# Patient Record
Sex: FEMALE | Race: WHITE | NOT HISPANIC OR LATINO | Employment: OTHER | URBAN - METROPOLITAN AREA
[De-identification: names, ages, dates, MRNs, and addresses within clinical notes are randomized per-mention and may not be internally consistent; named-entity substitution may affect disease eponyms.]

---

## 2023-10-02 ENCOUNTER — APPOINTMENT (OUTPATIENT)
Dept: ADMISSIONS | Facility: MEDICAL CENTER | Age: 73
End: 2023-10-02
Attending: ORTHOPAEDIC SURGERY
Payer: MEDICARE

## 2023-10-06 ENCOUNTER — PRE-ADMISSION TESTING (OUTPATIENT)
Dept: ADMISSIONS | Facility: MEDICAL CENTER | Age: 73
End: 2023-10-06
Attending: ORTHOPAEDIC SURGERY
Payer: MEDICARE

## 2023-10-06 DIAGNOSIS — Z01.810 PRE-OPERATIVE CARDIOVASCULAR EXAMINATION: ICD-10-CM

## 2023-10-06 DIAGNOSIS — Z01.812 PRE-OPERATIVE LABORATORY EXAMINATION: ICD-10-CM

## 2023-10-06 RX ORDER — MULTIVIT-MIN/IRON/FOLIC ACID/K 18-600-40
CAPSULE ORAL DAILY
COMMUNITY

## 2023-10-06 RX ORDER — COVID-19 ANTIGEN TEST
KIT MISCELLANEOUS PRN
COMMUNITY

## 2023-10-06 RX ORDER — SODIUM PHOSPHATE,MONO-DIBASIC 19G-7G/118
500 ENEMA (ML) RECTAL DAILY
COMMUNITY

## 2023-10-06 RX ORDER — CALCIUM CARBONATE 500 MG/1
500 TABLET, CHEWABLE ORAL PRN
COMMUNITY

## 2023-10-06 RX ORDER — OYSTER SHELL CALCIUM WITH VITAMIN D 500; 200 MG/1; [IU]/1
TABLET, FILM COATED ORAL 2 TIMES DAILY
COMMUNITY

## 2023-10-06 NOTE — PREPROCEDURE INSTRUCTIONS
PreAdmit Telephone Appointment: Reviewed the Preparing for your procedure handout with patient over the phone. Patient instructed per pharmacy guidelines regarding taking or holding regularly prescribed medications before surgery. Instructed to take the following medications the day of surgery with a sip of water per pharmacy medication guidelines: None  Patient instructed to notify physician of any illness prior to surgery. Pt denies issue with anesthesia.

## 2023-10-10 ENCOUNTER — PRE-ADMISSION TESTING (OUTPATIENT)
Dept: ADMISSIONS | Facility: MEDICAL CENTER | Age: 73
End: 2023-10-10
Attending: ORTHOPAEDIC SURGERY
Payer: MEDICARE

## 2023-10-10 DIAGNOSIS — Z01.812 PRE-OPERATIVE LABORATORY EXAMINATION: ICD-10-CM

## 2023-10-10 DIAGNOSIS — Z01.810 PRE-OPERATIVE CARDIOVASCULAR EXAMINATION: ICD-10-CM

## 2023-10-10 LAB
ABO GROUP BLD: NORMAL
ANION GAP SERPL CALC-SCNC: 12 MMOL/L (ref 7–16)
BUN SERPL-MCNC: 14 MG/DL (ref 8–22)
CALCIUM SERPL-MCNC: 8.8 MG/DL (ref 8.4–10.2)
CHLORIDE SERPL-SCNC: 102 MMOL/L (ref 96–112)
CO2 SERPL-SCNC: 26 MMOL/L (ref 20–33)
CREAT SERPL-MCNC: 0.71 MG/DL (ref 0.5–1.4)
EKG IMPRESSION: NORMAL
ERYTHROCYTE [DISTWIDTH] IN BLOOD BY AUTOMATED COUNT: 39.9 FL (ref 35.9–50)
GFR SERPLBLD CREATININE-BSD FMLA CKD-EPI: 90 ML/MIN/1.73 M 2
GLUCOSE SERPL-MCNC: 94 MG/DL (ref 65–99)
HCT VFR BLD AUTO: 43.1 % (ref 37–47)
HGB BLD-MCNC: 14.6 G/DL (ref 12–16)
MCH RBC QN AUTO: 31.5 PG (ref 27–33)
MCHC RBC AUTO-ENTMCNC: 33.9 G/DL (ref 32.2–35.5)
MCV RBC AUTO: 93.1 FL (ref 81.4–97.8)
PLATELET # BLD AUTO: 225 K/UL (ref 164–446)
PMV BLD AUTO: 9.6 FL (ref 9–12.9)
POTASSIUM SERPL-SCNC: 4.1 MMOL/L (ref 3.6–5.5)
RBC # BLD AUTO: 4.63 M/UL (ref 4.2–5.4)
RH BLD: NORMAL
SCCMEC + MECA PNL NOSE NAA+PROBE: NEGATIVE
SCCMEC + MECA PNL NOSE NAA+PROBE: NEGATIVE
SODIUM SERPL-SCNC: 140 MMOL/L (ref 135–145)
WBC # BLD AUTO: 6.1 K/UL (ref 4.8–10.8)

## 2023-10-10 PROCEDURE — 86901 BLOOD TYPING SEROLOGIC RH(D): CPT

## 2023-10-10 PROCEDURE — 80048 BASIC METABOLIC PNL TOTAL CA: CPT

## 2023-10-10 PROCEDURE — 36415 COLL VENOUS BLD VENIPUNCTURE: CPT

## 2023-10-10 PROCEDURE — 93005 ELECTROCARDIOGRAM TRACING: CPT

## 2023-10-10 PROCEDURE — 93010 ELECTROCARDIOGRAM REPORT: CPT | Performed by: INTERNAL MEDICINE

## 2023-10-10 PROCEDURE — 85027 COMPLETE CBC AUTOMATED: CPT

## 2023-10-10 PROCEDURE — 87640 STAPH A DNA AMP PROBE: CPT | Mod: XU

## 2023-10-10 PROCEDURE — 86900 BLOOD TYPING SEROLOGIC ABO: CPT

## 2023-10-10 PROCEDURE — 87641 MR-STAPH DNA AMP PROBE: CPT

## 2023-10-10 NOTE — DISCHARGE PLANNING
DISCHARGE PLANNING NOTE - TOTAL JOINT    Procedure: Procedure(s):  RIGHT TOTAL HIP ARTHROPLASTY  Procedure Date: 10/20/2023  Insurance: Payor: MEDICARE / Plan: MEDICARE PART A & B / Product Type: *No Product type* /    Equipment currently available at home?  crutches and will get something to sit on in the shower.  Steps into the home? 1  Steps within the home? 1 flight  Toilet height? Standard  Type of shower? tub-shower  Home Oxygen? No.  Portable tank?    Oxygen Provider:  Who will be with you during your recovery? spouse  Is Outpatient Physical Therapy set up after surgery? Yes  Did you take the Total Joint Class and where? Yes, received [facility] book.  Planning same day discharge?Yes     This writer met with pt and educated to preop showers, nasal mrsa swab and potential for overnight stay. CHG kit given to pt. Home safety checklist and equipment resource guide sent home with pt. Pt educated to dc criteria. All questions answered and verbalizes understanding of all instructions. No dc needs identified at this time. Anticipate dc to home without barriers.

## 2023-10-20 ENCOUNTER — APPOINTMENT (OUTPATIENT)
Dept: RADIOLOGY | Facility: MEDICAL CENTER | Age: 73
End: 2023-10-20
Payer: MEDICARE

## 2023-10-20 ENCOUNTER — ANESTHESIA (OUTPATIENT)
Dept: SURGERY | Facility: MEDICAL CENTER | Age: 73
End: 2023-10-20
Payer: MEDICARE

## 2023-10-20 ENCOUNTER — HOSPITAL ENCOUNTER (OUTPATIENT)
Facility: MEDICAL CENTER | Age: 73
End: 2023-10-20
Attending: ORTHOPAEDIC SURGERY | Admitting: ORTHOPAEDIC SURGERY
Payer: MEDICARE

## 2023-10-20 ENCOUNTER — ANESTHESIA EVENT (OUTPATIENT)
Dept: SURGERY | Facility: MEDICAL CENTER | Age: 73
End: 2023-10-20
Payer: MEDICARE

## 2023-10-20 VITALS
HEART RATE: 75 BPM | HEIGHT: 69 IN | OXYGEN SATURATION: 97 % | BODY MASS INDEX: 17.67 KG/M2 | WEIGHT: 119.27 LBS | RESPIRATION RATE: 16 BRPM | DIASTOLIC BLOOD PRESSURE: 72 MMHG | SYSTOLIC BLOOD PRESSURE: 125 MMHG | TEMPERATURE: 96.8 F

## 2023-10-20 DIAGNOSIS — Z01.812 PRE-OPERATIVE LABORATORY EXAMINATION: ICD-10-CM

## 2023-10-20 LAB
ABO GROUP BLD: NORMAL
BLD GP AB SCN SERPL QL: NORMAL
RH BLD: NORMAL

## 2023-10-20 PROCEDURE — 97165 OT EVAL LOW COMPLEX 30 MIN: CPT

## 2023-10-20 PROCEDURE — C1776 JOINT DEVICE (IMPLANTABLE): HCPCS | Performed by: ORTHOPAEDIC SURGERY

## 2023-10-20 PROCEDURE — 700111 HCHG RX REV CODE 636 W/ 250 OVERRIDE (IP): Mod: JZ | Performed by: ANESTHESIOLOGY

## 2023-10-20 PROCEDURE — A9270 NON-COVERED ITEM OR SERVICE: HCPCS | Performed by: ANESTHESIOLOGY

## 2023-10-20 PROCEDURE — 97162 PT EVAL MOD COMPLEX 30 MIN: CPT

## 2023-10-20 PROCEDURE — 160036 HCHG PACU - EA ADDL 30 MINS PHASE I: Performed by: ORTHOPAEDIC SURGERY

## 2023-10-20 PROCEDURE — 700101 HCHG RX REV CODE 250: Performed by: ANESTHESIOLOGY

## 2023-10-20 PROCEDURE — 700111 HCHG RX REV CODE 636 W/ 250 OVERRIDE (IP): Mod: JZ | Performed by: ORTHOPAEDIC SURGERY

## 2023-10-20 PROCEDURE — 160035 HCHG PACU - 1ST 60 MINS PHASE I: Performed by: ORTHOPAEDIC SURGERY

## 2023-10-20 PROCEDURE — 160047 HCHG PACU  - EA ADDL 30 MINS PHASE II: Performed by: ORTHOPAEDIC SURGERY

## 2023-10-20 PROCEDURE — 86850 RBC ANTIBODY SCREEN: CPT

## 2023-10-20 PROCEDURE — 700101 HCHG RX REV CODE 250: Mod: JZ | Performed by: ORTHOPAEDIC SURGERY

## 2023-10-20 PROCEDURE — 502000 HCHG MISC OR IMPLANTS RC 0278: Performed by: ORTHOPAEDIC SURGERY

## 2023-10-20 PROCEDURE — C1713 ANCHOR/SCREW BN/BN,TIS/BN: HCPCS | Performed by: ORTHOPAEDIC SURGERY

## 2023-10-20 PROCEDURE — 700102 HCHG RX REV CODE 250 W/ 637 OVERRIDE(OP): Performed by: ANESTHESIOLOGY

## 2023-10-20 PROCEDURE — 160042 HCHG SURGERY MINUTES - EA ADDL 1 MIN LEVEL 5: Performed by: ORTHOPAEDIC SURGERY

## 2023-10-20 PROCEDURE — 73502 X-RAY EXAM HIP UNI 2-3 VIEWS: CPT | Mod: RT

## 2023-10-20 PROCEDURE — 86900 BLOOD TYPING SEROLOGIC ABO: CPT

## 2023-10-20 PROCEDURE — 160009 HCHG ANES TIME/MIN: Performed by: ORTHOPAEDIC SURGERY

## 2023-10-20 PROCEDURE — 700105 HCHG RX REV CODE 258: Performed by: ORTHOPAEDIC SURGERY

## 2023-10-20 PROCEDURE — 160025 RECOVERY II MINUTES (STATS): Performed by: ORTHOPAEDIC SURGERY

## 2023-10-20 PROCEDURE — 160048 HCHG OR STATISTICAL LEVEL 1-5: Performed by: ORTHOPAEDIC SURGERY

## 2023-10-20 PROCEDURE — 86901 BLOOD TYPING SEROLOGIC RH(D): CPT

## 2023-10-20 PROCEDURE — 36415 COLL VENOUS BLD VENIPUNCTURE: CPT

## 2023-10-20 PROCEDURE — 160046 HCHG PACU - 1ST 60 MINS PHASE II: Performed by: ORTHOPAEDIC SURGERY

## 2023-10-20 PROCEDURE — 160002 HCHG RECOVERY MINUTES (STAT): Performed by: ORTHOPAEDIC SURGERY

## 2023-10-20 PROCEDURE — C9290 INJ, BUPIVACAINE LIPOSOME: HCPCS | Mod: JZ | Performed by: ORTHOPAEDIC SURGERY

## 2023-10-20 PROCEDURE — 160031 HCHG SURGERY MINUTES - 1ST 30 MINS LEVEL 5: Performed by: ORTHOPAEDIC SURGERY

## 2023-10-20 PROCEDURE — 97535 SELF CARE MNGMENT TRAINING: CPT

## 2023-10-20 DEVICE — IMPLANT REF SPHER HEAD SCREW 40MM (1EA): Type: IMPLANTABLE DEVICE | Site: HIP | Status: FUNCTIONAL

## 2023-10-20 DEVICE — IMPLANT OXINIUM FEM HD 12/14 36 MM -3 (1EA): Type: IMPLANTABLE DEVICE | Site: HIP | Status: FUNCTIONAL

## 2023-10-20 DEVICE — IMPLANT REF THREADED HOLE COVER (1EA): Type: IMPLANTABLE DEVICE | Site: HIP | Status: FUNCTIONAL

## 2023-10-20 DEVICE — IMPLANTABLE DEVICE: Type: IMPLANTABLE DEVICE | Site: HIP | Status: FUNCTIONAL

## 2023-10-20 DEVICE — LINER ACETABULAR R3 ODEG XLPE 36MMX54MM (1EA): Type: IMPLANTABLE DEVICE | Site: HIP | Status: FUNCTIONAL

## 2023-10-20 DEVICE — IMPLANT R3 3 HOLE ACET SHELL 54MM (1EA): Type: IMPLANTABLE DEVICE | Site: HIP | Status: FUNCTIONAL

## 2023-10-20 RX ORDER — ACETAMINOPHEN 500 MG
1000 TABLET ORAL ONCE
Status: COMPLETED | OUTPATIENT
Start: 2023-10-20 | End: 2023-10-20

## 2023-10-20 RX ORDER — DEXAMETHASONE SODIUM PHOSPHATE 4 MG/ML
INJECTION, SOLUTION INTRA-ARTICULAR; INTRALESIONAL; INTRAMUSCULAR; INTRAVENOUS; SOFT TISSUE PRN
Status: DISCONTINUED | OUTPATIENT
Start: 2023-10-20 | End: 2023-10-20 | Stop reason: SURG

## 2023-10-20 RX ORDER — OXYCODONE HYDROCHLORIDE 10 MG/1
10 TABLET ORAL
Status: CANCELLED | OUTPATIENT
Start: 2023-10-20

## 2023-10-20 RX ORDER — SODIUM CHLORIDE 9 MG/ML
INJECTION, SOLUTION INTRAMUSCULAR; INTRAVENOUS; SUBCUTANEOUS
Status: DISCONTINUED | OUTPATIENT
Start: 2023-10-20 | End: 2023-10-20 | Stop reason: HOSPADM

## 2023-10-20 RX ORDER — DOCUSATE SODIUM 100 MG/1
100 CAPSULE, LIQUID FILLED ORAL 2 TIMES DAILY
Status: CANCELLED | OUTPATIENT
Start: 2023-10-20

## 2023-10-20 RX ORDER — LIDOCAINE HYDROCHLORIDE 20 MG/ML
INJECTION, SOLUTION EPIDURAL; INFILTRATION; INTRACAUDAL; PERINEURAL PRN
Status: DISCONTINUED | OUTPATIENT
Start: 2023-10-20 | End: 2023-10-20 | Stop reason: SURG

## 2023-10-20 RX ORDER — HYDROMORPHONE HYDROCHLORIDE 1 MG/ML
0.5 INJECTION, SOLUTION INTRAMUSCULAR; INTRAVENOUS; SUBCUTANEOUS
Status: DISCONTINUED | OUTPATIENT
Start: 2023-10-20 | End: 2023-10-20 | Stop reason: HOSPADM

## 2023-10-20 RX ORDER — OXYCODONE HYDROCHLORIDE 5 MG/1
5 TABLET ORAL
Status: CANCELLED | OUTPATIENT
Start: 2023-10-20

## 2023-10-20 RX ORDER — SODIUM CHLORIDE, SODIUM LACTATE, POTASSIUM CHLORIDE, CALCIUM CHLORIDE 600; 310; 30; 20 MG/100ML; MG/100ML; MG/100ML; MG/100ML
INJECTION, SOLUTION INTRAVENOUS CONTINUOUS
Status: DISCONTINUED | OUTPATIENT
Start: 2023-10-20 | End: 2023-10-20 | Stop reason: HOSPADM

## 2023-10-20 RX ORDER — ENEMA 19; 7 G/133ML; G/133ML
1 ENEMA RECTAL
Status: CANCELLED | OUTPATIENT
Start: 2023-10-20

## 2023-10-20 RX ORDER — OXYCODONE HCL 5 MG/5 ML
5 SOLUTION, ORAL ORAL
Status: DISCONTINUED | OUTPATIENT
Start: 2023-10-20 | End: 2023-10-20 | Stop reason: HOSPADM

## 2023-10-20 RX ORDER — ROCURONIUM BROMIDE 10 MG/ML
INJECTION, SOLUTION INTRAVENOUS PRN
Status: DISCONTINUED | OUTPATIENT
Start: 2023-10-20 | End: 2023-10-20 | Stop reason: SURG

## 2023-10-20 RX ORDER — CELECOXIB 200 MG/1
200 CAPSULE ORAL ONCE
Status: DISCONTINUED | OUTPATIENT
Start: 2023-10-20 | End: 2023-10-20 | Stop reason: HOSPADM

## 2023-10-20 RX ORDER — BISACODYL 10 MG
10 SUPPOSITORY, RECTAL RECTAL
Status: CANCELLED | OUTPATIENT
Start: 2023-10-20

## 2023-10-20 RX ORDER — DEXAMETHASONE SODIUM PHOSPHATE 4 MG/ML
10 INJECTION, SOLUTION INTRA-ARTICULAR; INTRALESIONAL; INTRAMUSCULAR; INTRAVENOUS; SOFT TISSUE ONCE
Status: CANCELLED | OUTPATIENT
Start: 2023-10-21 | End: 2023-10-21

## 2023-10-20 RX ORDER — ROPIVACAINE HYDROCHLORIDE 5 MG/ML
INJECTION, SOLUTION EPIDURAL; INFILTRATION; PERINEURAL
Status: DISCONTINUED | OUTPATIENT
Start: 2023-10-20 | End: 2023-10-20 | Stop reason: HOSPADM

## 2023-10-20 RX ORDER — HYDROMORPHONE HYDROCHLORIDE 1 MG/ML
0.2 INJECTION, SOLUTION INTRAMUSCULAR; INTRAVENOUS; SUBCUTANEOUS
Status: DISCONTINUED | OUTPATIENT
Start: 2023-10-20 | End: 2023-10-20 | Stop reason: HOSPADM

## 2023-10-20 RX ORDER — ASPIRIN 325 MG
325 TABLET ORAL DAILY
Status: CANCELLED | OUTPATIENT
Start: 2023-10-20

## 2023-10-20 RX ORDER — ONDANSETRON 2 MG/ML
4 INJECTION INTRAMUSCULAR; INTRAVENOUS
Status: COMPLETED | OUTPATIENT
Start: 2023-10-20 | End: 2023-10-20

## 2023-10-20 RX ORDER — DEXAMETHASONE SODIUM PHOSPHATE 4 MG/ML
4 INJECTION, SOLUTION INTRA-ARTICULAR; INTRALESIONAL; INTRAMUSCULAR; INTRAVENOUS; SOFT TISSUE
Status: CANCELLED | OUTPATIENT
Start: 2023-10-20

## 2023-10-20 RX ORDER — HYDROMORPHONE HYDROCHLORIDE 1 MG/ML
0.4 INJECTION, SOLUTION INTRAMUSCULAR; INTRAVENOUS; SUBCUTANEOUS
Status: DISCONTINUED | OUTPATIENT
Start: 2023-10-20 | End: 2023-10-20 | Stop reason: HOSPADM

## 2023-10-20 RX ORDER — KETOROLAC TROMETHAMINE 30 MG/ML
INJECTION, SOLUTION INTRAMUSCULAR; INTRAVENOUS
Status: DISCONTINUED | OUTPATIENT
Start: 2023-10-20 | End: 2023-10-20 | Stop reason: HOSPADM

## 2023-10-20 RX ORDER — MAGNESIUM SULFATE HEPTAHYDRATE 40 MG/ML
INJECTION, SOLUTION INTRAVENOUS PRN
Status: DISCONTINUED | OUTPATIENT
Start: 2023-10-20 | End: 2023-10-20 | Stop reason: SURG

## 2023-10-20 RX ORDER — HALOPERIDOL 5 MG/ML
1 INJECTION INTRAMUSCULAR
Status: DISCONTINUED | OUTPATIENT
Start: 2023-10-20 | End: 2023-10-20 | Stop reason: HOSPADM

## 2023-10-20 RX ORDER — MIDAZOLAM HYDROCHLORIDE 1 MG/ML
INJECTION INTRAMUSCULAR; INTRAVENOUS PRN
Status: DISCONTINUED | OUTPATIENT
Start: 2023-10-20 | End: 2023-10-20 | Stop reason: SURG

## 2023-10-20 RX ORDER — HYDROMORPHONE HYDROCHLORIDE 2 MG/ML
INJECTION, SOLUTION INTRAMUSCULAR; INTRAVENOUS; SUBCUTANEOUS PRN
Status: DISCONTINUED | OUTPATIENT
Start: 2023-10-20 | End: 2023-10-20 | Stop reason: SURG

## 2023-10-20 RX ORDER — ACETAMINOPHEN 500 MG
1000 TABLET ORAL EVERY 6 HOURS PRN
Status: CANCELLED | OUTPATIENT
Start: 2023-10-25

## 2023-10-20 RX ORDER — MORPHINE SULFATE 4 MG/ML
INJECTION INTRAVENOUS
Status: DISCONTINUED | OUTPATIENT
Start: 2023-10-20 | End: 2023-10-20 | Stop reason: HOSPADM

## 2023-10-20 RX ORDER — AMOXICILLIN 250 MG
1 CAPSULE ORAL
Status: CANCELLED | OUTPATIENT
Start: 2023-10-20

## 2023-10-20 RX ORDER — AMOXICILLIN 250 MG
1 CAPSULE ORAL NIGHTLY
Status: CANCELLED | OUTPATIENT
Start: 2023-10-20

## 2023-10-20 RX ORDER — MORPHINE SULFATE 4 MG/ML
4 INJECTION INTRAVENOUS
Status: CANCELLED | OUTPATIENT
Start: 2023-10-20

## 2023-10-20 RX ORDER — ONDANSETRON 2 MG/ML
4 INJECTION INTRAMUSCULAR; INTRAVENOUS EVERY 4 HOURS PRN
Status: CANCELLED | OUTPATIENT
Start: 2023-10-20

## 2023-10-20 RX ORDER — MAGNESIUM SULFATE HEPTAHYDRATE 40 MG/ML
INJECTION, SOLUTION INTRAVENOUS PRN
Status: DISCONTINUED | OUTPATIENT
Start: 2023-10-20 | End: 2023-10-20

## 2023-10-20 RX ORDER — HYDRALAZINE HYDROCHLORIDE 20 MG/ML
5 INJECTION INTRAMUSCULAR; INTRAVENOUS
Status: DISCONTINUED | OUTPATIENT
Start: 2023-10-20 | End: 2023-10-20 | Stop reason: HOSPADM

## 2023-10-20 RX ORDER — ONDANSETRON 2 MG/ML
INJECTION INTRAMUSCULAR; INTRAVENOUS PRN
Status: DISCONTINUED | OUTPATIENT
Start: 2023-10-20 | End: 2023-10-20 | Stop reason: SURG

## 2023-10-20 RX ORDER — ACETAMINOPHEN 500 MG
1000 TABLET ORAL EVERY 6 HOURS
Status: CANCELLED | OUTPATIENT
Start: 2023-10-20 | End: 2023-10-25

## 2023-10-20 RX ORDER — SCOLOPAMINE TRANSDERMAL SYSTEM 1 MG/1
1 PATCH, EXTENDED RELEASE TRANSDERMAL
Status: CANCELLED | OUTPATIENT
Start: 2023-10-20

## 2023-10-20 RX ORDER — SODIUM CHLORIDE, SODIUM LACTATE, POTASSIUM CHLORIDE, CALCIUM CHLORIDE 600; 310; 30; 20 MG/100ML; MG/100ML; MG/100ML; MG/100ML
INJECTION, SOLUTION INTRAVENOUS CONTINUOUS
Status: ACTIVE | OUTPATIENT
Start: 2023-10-20 | End: 2023-10-20

## 2023-10-20 RX ORDER — IBUPROFEN 400 MG/1
800 TABLET ORAL 3 TIMES DAILY
Status: CANCELLED | OUTPATIENT
Start: 2023-10-20 | End: 2023-10-25

## 2023-10-20 RX ORDER — OXYCODONE HCL 5 MG/5 ML
10 SOLUTION, ORAL ORAL
Status: DISCONTINUED | OUTPATIENT
Start: 2023-10-20 | End: 2023-10-20 | Stop reason: HOSPADM

## 2023-10-20 RX ORDER — DIPHENHYDRAMINE HCL 25 MG
25 TABLET ORAL NIGHTLY PRN
Status: CANCELLED | OUTPATIENT
Start: 2023-10-21

## 2023-10-20 RX ORDER — TRANEXAMIC ACID 100 MG/ML
INJECTION, SOLUTION INTRAVENOUS PRN
Status: DISCONTINUED | OUTPATIENT
Start: 2023-10-20 | End: 2023-10-20 | Stop reason: SURG

## 2023-10-20 RX ORDER — POLYETHYLENE GLYCOL 3350 17 G/17G
1 POWDER, FOR SOLUTION ORAL 2 TIMES DAILY PRN
Status: CANCELLED | OUTPATIENT
Start: 2023-10-20

## 2023-10-20 RX ORDER — CEFAZOLIN SODIUM 1 G/3ML
INJECTION, POWDER, FOR SOLUTION INTRAMUSCULAR; INTRAVENOUS PRN
Status: DISCONTINUED | OUTPATIENT
Start: 2023-10-20 | End: 2023-10-20 | Stop reason: SURG

## 2023-10-20 RX ORDER — DIPHENHYDRAMINE HYDROCHLORIDE 50 MG/ML
25 INJECTION INTRAMUSCULAR; INTRAVENOUS EVERY 6 HOURS PRN
Status: CANCELLED | OUTPATIENT
Start: 2023-10-20

## 2023-10-20 RX ORDER — IBUPROFEN 400 MG/1
800 TABLET ORAL 3 TIMES DAILY PRN
Status: CANCELLED | OUTPATIENT
Start: 2023-10-25

## 2023-10-20 RX ADMIN — LIDOCAINE HYDROCHLORIDE 0.5 ML: 10 INJECTION, SOLUTION EPIDURAL; INFILTRATION; INTRACAUDAL; PERINEURAL at 06:57

## 2023-10-20 RX ADMIN — MAGNESIUM SULFATE HEPTAHYDRATE 3 G: 2 INJECTION, SOLUTION INTRAVENOUS at 08:21

## 2023-10-20 RX ADMIN — ROCURONIUM BROMIDE 50 MG: 50 INJECTION, SOLUTION INTRAVENOUS at 08:04

## 2023-10-20 RX ADMIN — HYDROMORPHONE HYDROCHLORIDE 0.5 MG: 2 INJECTION INTRAMUSCULAR; INTRAVENOUS; SUBCUTANEOUS at 08:22

## 2023-10-20 RX ADMIN — DEXAMETHASONE SODIUM PHOSPHATE 8 MG: 4 INJECTION INTRA-ARTICULAR; INTRALESIONAL; INTRAMUSCULAR; INTRAVENOUS; SOFT TISSUE at 08:07

## 2023-10-20 RX ADMIN — SUGAMMADEX 150 MG: 100 INJECTION, SOLUTION INTRAVENOUS at 09:16

## 2023-10-20 RX ADMIN — TRANEXAMIC ACID 1000 MG: 100 INJECTION, SOLUTION INTRAVENOUS at 09:13

## 2023-10-20 RX ADMIN — HYDRALAZINE HYDROCHLORIDE 5 MG: 20 INJECTION, SOLUTION INTRAMUSCULAR; INTRAVENOUS at 10:14

## 2023-10-20 RX ADMIN — CEFAZOLIN 2 G: 1 INJECTION, POWDER, FOR SOLUTION INTRAMUSCULAR; INTRAVENOUS at 08:00

## 2023-10-20 RX ADMIN — PROPOFOL 120 MG: 10 INJECTION, EMULSION INTRAVENOUS at 08:04

## 2023-10-20 RX ADMIN — ACETAMINOPHEN 1000 MG: 500 TABLET ORAL at 06:57

## 2023-10-20 RX ADMIN — ONDANSETRON 4 MG: 2 INJECTION INTRAMUSCULAR; INTRAVENOUS at 09:15

## 2023-10-20 RX ADMIN — LIDOCAINE HYDROCHLORIDE 60 MG: 20 INJECTION, SOLUTION EPIDURAL; INFILTRATION; INTRACAUDAL at 08:04

## 2023-10-20 RX ADMIN — TRANEXAMIC ACID 1000 MG: 100 INJECTION, SOLUTION INTRAVENOUS at 08:04

## 2023-10-20 RX ADMIN — HYDROMORPHONE HYDROCHLORIDE 0.5 MG: 2 INJECTION INTRAMUSCULAR; INTRAVENOUS; SUBCUTANEOUS at 08:04

## 2023-10-20 RX ADMIN — SODIUM CHLORIDE, POTASSIUM CHLORIDE, SODIUM LACTATE AND CALCIUM CHLORIDE: 600; 310; 30; 20 INJECTION, SOLUTION INTRAVENOUS at 06:58

## 2023-10-20 RX ADMIN — MIDAZOLAM 2 MG: 1 INJECTION, SOLUTION INTRAMUSCULAR; INTRAVENOUS at 08:00

## 2023-10-20 RX ADMIN — ONDANSETRON 4 MG: 2 INJECTION INTRAMUSCULAR; INTRAVENOUS at 10:41

## 2023-10-20 RX ADMIN — SODIUM CHLORIDE, POTASSIUM CHLORIDE, SODIUM LACTATE AND CALCIUM CHLORIDE: 600; 310; 30; 20 INJECTION, SOLUTION INTRAVENOUS at 09:15

## 2023-10-20 ASSESSMENT — COGNITIVE AND FUNCTIONAL STATUS - GENERAL
SUGGESTED CMS G CODE MODIFIER MOBILITY: CI
DRESSING REGULAR LOWER BODY CLOTHING: A LITTLE
TOILETING: A LITTLE
CLIMB 3 TO 5 STEPS WITH RAILING: A LITTLE
SUGGESTED CMS G CODE MODIFIER DAILY ACTIVITY: CJ
DAILY ACTIVITIY SCORE: 21
MOBILITY SCORE: 23
HELP NEEDED FOR BATHING: A LITTLE

## 2023-10-20 ASSESSMENT — GAIT ASSESSMENTS
DEVIATION: STEP TO
GAIT LEVEL OF ASSIST: STANDBY ASSIST
ASSISTIVE DEVICE: FRONT WHEEL WALKER
DISTANCE (FEET): 100

## 2023-10-20 ASSESSMENT — PAIN DESCRIPTION - PAIN TYPE
TYPE: SURGICAL PAIN

## 2023-10-20 ASSESSMENT — ACTIVITIES OF DAILY LIVING (ADL): TOILETING: INDEPENDENT

## 2023-10-20 ASSESSMENT — PAIN SCALES - GENERAL: PAIN_LEVEL: 3

## 2023-10-20 NOTE — OP REPORT
Date of Surgery:   10/20/2023     Preoperative Diagnosis:  Right hip osteoarthritis      Postoperative Diagnosis:  Right hip osteoarthritis Iliac cyst formation      Operation: 1. Right Hip Total Replacement,                      2. Right Iliac cyst excision and bone graft           3. Intraoperative block for relief of extensive postoperative pain.      Surgeon:   Josh Avina MD  Assistant: BRANDI Hunter     Anesthesia and Anesthesiologist:  , Dr. Bal Fung  Findings:  Stable GHASSAN with Iliac cysts     Complications:  None      IMPLANTS : [employer] size 54 R3 cup with a 36 flat cross-linked polyethylene on a size 10 standard offset anthology stem with a -3 neck length 36 mm Oxinium femoral head     WEIGHTBEARING : As tolerated.      PROCEDURE IN DETAIL : The procedure is coded with secondary difficulty in the setting of increased length of incision, supplemental retractor placement and double the normal duration of the surgery due to a combination of the severity of the deformity and requirement for supplemental visualization and incision length increase. The patient was taken to the operating room and underwent general anesthetic in the supine position. All bony prominences were padded. Lateral decubitus undertaken with an axillary roll, Stulberg, and all bony prominences were padded. The right lower extremity was isolated and prepped using Hibiclens, alcohol, ChloraPrep, and draped using sterile technique. Surgery initiated with a minimally invasive posterior approach.      Skin and subcutaneous tissue were incised. Hemostasis obtained and the fascia mandi incised after an appropriate timeout. The East-West retractor was utilized. The short external rotator was taken down as a single layer. A T-capsular incision made. Dislocation of the hip followed.    Intraoperative periarticular and extensive andrea-incisional block was undertaken. This was undertaken with a combination of Toradol  ropivacaine and morphine to treat postoperative pain. This was done extensively throughout the pericapsular tissues, followed by an extensive block in the peritrochanteric tissues, followed by an extensive tissue infusion into the subcutaneous tissues. This obtained a significant intraoperative improvement in anesthesia requirements as well as pain management and vitals stabilization.      A dislocation of the hip followed by a femoral neck osteotomy and then an acetabular reaming and labral excision.  Iliac cysts were encountered after centralization of the cup. This was treated with decompression, curettage and bone grafting.  The cyst size was 1.5 x 1.2 x 1.0 cm.  Structural graft was harvested from the femoral head and reamings.   This was reamed, reverse reamed, and placement of cup was then undertaken. The cup was inserted and seated well. The peripheral rim osteophytic changes removed and the polyethylene inserted and the locking mechanism checked. Pulse lavage repeated.      The femur was addressed with an anterior trochanteric decompression and intramedullary reamer, cookie cutter, and broaching. Trial reductions were repeated and insertion of final stem was performed.  Excellent stability was obtained.  Placement of the head was then performed.  The trial was repeated. The final head inserted after pulse lavage and cleaning and drying the Quinones taper and then the hip relocated with full extension and external rotation, stability, sleeping and flex 90 stability to 80 and 60 degrees. Pulse lavage was repeated. T-capsular closure was done with #2 FiberWire.  Then short external rotators were repaired over bone bridges and the fascia mandi repaired with #1 Quill. Subcuticular Monocryl was utilized and staples used on the skin. The patient placed into a sterile bandage, awoken from anesthetic, and taken to the recovery room, tolerated the procedure very well with no signs of complications.

## 2023-10-20 NOTE — OR NURSING
1101- Pt ambulated to stage II with PT and CNA stand by assist and use of walker. Settled in recliner. Pt working with PT at this time. Family at the bedside.      1135-PT finished working with the patient.     1153-OT at the bedside working with the patient.     1227- D/Jalil to care of family post uneventful stay in PACU. Right hip surgical dressing C/D/I. PIV removed. Discharge instructions read to the patient and .  Questions answered at this time.  Pt discharged home via wheelchair by CNA.

## 2023-10-20 NOTE — ANESTHESIA POSTPROCEDURE EVALUATION
Patient: Jessica Yoder Kennett    Procedure Summary     Date: 10/20/23 Room / Location: [place] [address] / SURGERY [facility]    Anesthesia Start: 0800 Anesthesia Stop: 0932    Procedure: RIGHT TOTAL HIP ARTHROPLASTY, CYST EXCISION, AND BONE GRAFT OF THE HILIUM (Right: Hip) Diagnosis: (OA OF RIGHT HIP JOINT)    Surgeons: Josh Avina M.D. Responsible Provider: Lan Esqueda M.D.    Anesthesia Type: general ASA Status: 2          Final Anesthesia Type: general  Last vitals  BP   Blood Pressure : (!) 154/90    Temp   36.3 °C (97.3 °F)    Pulse   (!) 53   Resp   16    SpO2   100 %      Anesthesia Post Evaluation    Patient location during evaluation: PACU  Patient participation: complete - patient participated  Level of consciousness: awake and alert  Pain score: 3    Airway patency: patent  Anesthetic complications: no  Cardiovascular status: hemodynamically stable  Respiratory status: acceptable  Hydration status: euvolemic    PONV: none          There were no known notable events for this encounter.     Nurse Pain Score: 3 (NPRS)

## 2023-10-20 NOTE — THERAPY
Occupational Therapy   Initial Evaluation     Patient Name: Jessica Darden  Age:  73 y.o., Sex:  female  Medical Record #: [id number]  Today's Date: 10/20/2023     Precautions  Precautions: (P) Posterior Hip Precautions, Weight Bearing As Tolerated Right Lower Extremity    Assessment  Patient is 73 y.o. female with a diagnosis of Right GHASSAN (posterior).  Additional factors influencing patient status / progress: Impaired balance, limited activity tolerance, posterior hip precautions, R hip discomfort.   Pt and  reside in a 2 story home in [place], [place].  Primary bedroom is on the 2nd level.  Accommodations have been made to sleep on the ground level.   is available to assist as needed.  PLOF Indep for ADL's, transfers and ambulation w/out a device.  Therapist reviewed/educated pt and  on environmental/home safety, fall precautions, Posterior hip precautions, WBAT RLE, AE/DME, ADL's and transfers.    Plan    Discharge Recommendations: (P) Anticipate that the patient will have no further occupational therapy needs after discharge from the hospital     Subjective    Pt was alert and cooperative w/ tx.     Objective       10/20/23 1231    Services   Is patient using  services for this encounter? No   Prior Living Situation   Prior Services None   Housing / Facility 2 Story House   Steps Into Home 1   Steps In Home 15   Rail Left Rail (Steps in Home)   Elevator No   Bathroom Set up Bathtub / Shower Combination;Shower Curtain   Equipment Owned Front-Wheel Walker   Lives with - Patient's Self Care Capacity Spouse   Comments Pt and  reside in a 2 story home in [place], [place].  Primary bedroom is on the 2nd level.  Accommodations have been made to sleep on the ground level.   is available to assist as needed.  PLOF Indep for ADL's, transfers and ambulation w/out a device.   Prior Level of ADL Function   Self Feeding Independent   Grooming / Hygiene Independent   Bathing  Independent   Dressing Independent   Toileting Independent   Prior Level of IADL Function   Medication Management Independent   Laundry Independent   Kitchen Mobility Independent   Finances Independent   Home Management Independent   Shopping Independent   Prior Level Of Mobility Independent Without Device in Community;Independent With Steps in Community;Independent Without Device in Home;Independent With Steps in Home   Driving / Transportation Driving Independent   Occupation (Pre-Hospital Vocational) Retired Due To Age   History of Falls   History of Falls No   Precautions   Precautions Posterior Hip Precautions;Weight Bearing As Tolerated Right Lower Extremity   Vitals   Pulse Oximetry 97 %   O2 (LPM) 0   O2 Delivery Device Room air w/o2 available   Pain   Intervention Cold Pack;Rest;Repositioned   Pain 0 - 10 Group   Location Hip;Leg   Location Orientation Right   Description Aching   Comfort Goal Comfort with Movement;Perform Activity   Therapist Pain Assessment Prior to Activity;During Activity;Post Activity;Nurse Notified;2   Non Verbal Descriptors   Non Verbal Scale  Calm;Unlabored Breathing   Cognition    Cognition / Consciousness WDL   Passive ROM Upper Body   Passive ROM Upper Body WDL   Active ROM Upper Body   Active ROM Upper Body  WDL   Dominant Hand Right   Strength Upper Body   Upper Body Strength  WDL   Sensation Upper Body   Upper Extremity Sensation  WDL   Upper Body Muscle Tone   Upper Body Muscle Tone  WDL   Coordination Upper Body   Coordination WDL   Balance Assessment   Sitting Balance (Static) Good   Sitting Balance (Dynamic) Good   Standing Balance (Static) Fair +   Standing Balance (Dynamic) Fair +   Weight Shift Sitting Good   Weight Shift Standing Good   Comments OOB FWW   ADL Assessment   Upper Body Dressing Independent   Lower Body Dressing Minimal Assist   Toileting Minimal Assist   How much help from another person does the patient currently need...   Putting on and taking off  regular lower body clothing? 3   Bathing (including washing, rinsing, and drying)? 3   Toileting, which includes using a toilet, bedpan, or urinal? 3   Putting on and taking off regular upper body clothing? 4   Taking care of personal grooming such as brushing teeth? 4   Eating meals? 4   6 Clicks Daily Activity Score 21   Functional Mobility   Sit to Stand Standby Assist   Bed, Chair, Wheelchair Transfer Standby Assist   Transfer Method Stand Step   Edema / Skin Assessment   Edema / Skin  WDL   Comments Surgical site clean, dry and intact.   Activity Tolerance   Sitting in Chair 20   Standing 5x2   Education Group   Education Provided Hip Precautions;Joint Protection;Home Safety;Transfers;Role of Occupational Therapist;Activities of Daily Living;Adaptive Equipment   Role of Occupational Therapist Patient Response Patient;Significant Other;Acceptance;Explanation;Verbal Demonstration   Hip Precautions Patient Response Patient;Significant Other;Acceptance;Explanation;Demonstration;Handout;Teach Back;Verbal Demonstration;Action Demonstration   Joint Protection Patient Response Patient;Significant Other;Acceptance;Explanation;Demonstration;Handout;Teach Back;Verbal Demonstration;Action Demonstration   Home Safety Patient Response Patient;Significant Other;Acceptance;Explanation;Demonstration;Verbal Demonstration;Action Demonstration;Teach Back   Transfers Patient Response Patient;Significant Other;Acceptance;Explanation;Demonstration;Teach Back;Verbal Demonstration;Action Demonstration   ADL Patient Response Patient;Significant Other;Acceptance;Explanation;Demonstration;Teach Back;Verbal Demonstration;Action Demonstration   Adaptive Equipment Patient Response Patient;Significant Other;Acceptance;Explanation;Demonstration;Handout;Teach Back;Verbal Demonstration;Action Demonstration   Anticipated Discharge Equipment and Recommendations   Discharge Recommendations Anticipate that the patient will have no further  occupational therapy needs after discharge from the hospital

## 2023-10-20 NOTE — DISCHARGE INSTRUCTIONS
Surgeon specific instructions  1. Discharge home with crutches or a walker   2. Discharge home on home meds per home doses   3. Discharge home on Percocet, ASA, and outpatient physical therapy.   4. Follow up [facility] 10-14 days   5. Call for Fevers, drainage, redness, shortness of breath, or increasing lower extremity swelling particularly in the calf.   6. Start Aspirin 325mg. Use for 45 days.   7. Leave Bandage in place, call if drainage or wetness.   8. Refer to packet from  for specific instructions about showering and dressing care.    ACTIVITY: Rest and take it easy for the first 24 hours.  A responsible adult is recommended to remain with you during that time.  It is normal to feel sleepy.  We encourage you to not do anything that requires balance, judgment or coordination.    MILD FLU-LIKE SYMPTOMS ARE NORMAL. YOU MAY EXPERIENCE GENERALIZED MUSCLE ACHES, THROAT IRRITATION, HEADACHE AND/OR SOME NAUSEA.    FOR 24 HOURS DO NOT:  Drive, operate machinery or run household appliances.  Drink beer or alcoholic beverages.   Make important decisions or sign legal documents.    DIET: To avoid nausea, slowly advance diet as tolerated, avoiding spicy or greasy foods for the first day.  Add more substantial food to your diet according to your physician's instructions. INCREASE FLUIDS AND FIBER TO AVOID CONSTIPATION.    FOLLOW-UP APPOINTMENT:  A follow-up appointment should be arranged with your doctor; call to schedule.    You should CALL YOUR PHYSICIAN if you develop:  Fever greater than 101 degrees F.  Pain not relieved by medication, or persistent nausea or vomiting.  Excessive bleeding (blood soaking through dressing) or unexpected drainage from the wound.  Extreme redness or swelling around the incision site, drainage of pus or foul smelling drainage.  Inability to urinate or empty your bladder within 8 hours.  Problems with breathing or chest pain.    You should call 911 if you develop problems with  breathing or chest pain.  If you are unable to contact your doctor or surgical center, you should go to the nearest emergency room or urgent care center.  Physician's telephone #: [phone]    If any questions arise, call your doctor.  If your doctor is not available, please feel free to call the Surgical Center at [phone].     A registered nurse may call you a few days after your surgery to see how you are doing after your procedure.    MEDICATIONS: Resume taking daily medication.  Take prescribed pain medication with food.  If no medication is prescribed, you may take non-aspirin pain medication if needed.  PAIN MEDICATION CAN BE VERY CONSTIPATING.  Take a stool softener or laxative such as senokot, pericolace, or milk of magnesia if needed.    Last pain medication given at ______________.    If your physician has prescribed pain medication that includes Acetaminophen (Tylenol), do not take additional Acetaminophen (Tylenol) while taking the prescribed medication.    IMPORTANT NOTE IF YOU RECEIVED EXPAREL:    The liposomal bupivacaine (Exparel) teal arm band is used as a visual queue to alert healthcare professionals that you received a long-acting form of bupivacaine during your recent surgery. You should not receive additional local anesthetics (i.e. bupivacaine, lidocaine) for 96 hours after surgery. There may be situations where you are unable to communicate this information, so it is important for your safety to keep the teal arm band on for 96 hours (4 days) after surgery.       Total Hip Replacement Discharge Instructions    ACTIVITY  The first week after your hip replacement is a time to recover from the surgery. We expect light exercise to keep you active and mobile.  Posterior Hip Precautions    To care for your new hip and keep it from sliding out of position, you'll need to follow a few general rules at first.     Don't bend your hip past a 90 degree angle.  Don't cross your legs.  Don't  twist your hip inwards-keep knees and toes pointed upwards.     ASSISTED DEVICES: You are being discharged with the following special equipment:  Not Applicable; Has a walker and crutches     DRESSING AND WOUND CARE   Keep dressing clean and dry. Leave dressing in place until follow up.     SHOWER / BATHING   OK to shower, keep dressing in place. Pat dressing dry, do not rub incision.  Swimming pools, hot tubs, or baths are to be avoided until after your follow up and then only if cleared by your surgeon.      APPLY ICE PACK TO HIP   Apply ice packs to your hip (15 minutes on the hip, 15 minutes off the hip) for the first week, as you feel necessary to help with the pain and swelling.    SWELLING/BRUISING  Swelling is an expected response to surgery. To reduce swelling, elevate your surgical limb above heart level multiple times per day and apply ice (see Ice instructions). If your swelling becomes excessive, is limiting your ability to move, or becomes worrisome please contact your doctor's office.    Bruising often does not appear until after you arrive home and can be quite dramatic-appearing purple, black, or green. Bruising is typically not concerning and will subside without any treatment.     FOLLOW-UP  Make sure that you have an appointment 7-14 days following surgery.Your procedure/rehabilitation will be discussed and physical therapy may be prescribed at that time.

## 2023-10-20 NOTE — ANESTHESIA PROCEDURE NOTES
Airway    Date/Time: 10/20/2023 8:05 AM    Performed by: Lan Esqueda M.D.  Authorized by: Lan Esqueda M.D.    Location:  OR  Urgency:  Elective  Difficult Airway: No    Indications for Airway Management:  Anesthesia      Spontaneous Ventilation: absent    Sedation Level:  Deep  Preoxygenated: Yes    Patient Position:  Sniffing  Mask Difficulty Assessment:  1 - vent by mask  Final Airway Type:  Endotracheal airway  Final Endotracheal Airway:  ETT  Cuffed: Yes    Technique Used for Successful ETT Placement:  Direct laryngoscopy    Insertion Site:  Oral  Blade Type:  Paul  Laryngoscope Blade/Videolaryngoscope Blade Size:  2  ETT Size (mm):  6.5  Measured from:  Lips  ETT to Lips (cm):  20  Placement Verified by: auscultation and capnometry    Cormack-Lehane Classification:  Grade I - full view of glottis  Number of Attempts at Approach:  1

## 2023-10-20 NOTE — OR NURSING
Pt allergies and NPO status verified, home meds reconcilled. Belongings secured. Pt verbalizes understanding of the pain scale, expected course of stay, and plan of care.  Surgical site verified with pt.  IV access established.  Sequential/ teds placed on L leg.

## 2023-10-20 NOTE — THERAPY
Physical Therapy   Initial Evaluation     Patient Name: Jessica Darden  Age:  73 y.o., Sex:  female  Medical Record #: [id number]  Today's Date: 10/20/2023     Precautions  Precautions: (P) Posterior Hip Precautions;Weight Bearing As Tolerated Right Lower Extremity    Assessment  Patient is 73 y.o. female with a diagnosis of R THR post.Pt lives at home with  and is active.Pt is safe with bed mob,transfers,ambulation and stairs.She understands HEP and precautions and has no equipment needs    Plan    DC Equipment Recommendations: (P) None  Discharge Recommendations: (P) Recommend home health for continued physical therapy services       Objective       10/20/23 1110   Charge Group   PT Evaluation PT Evaluation Mod   Total Time Spent   PT Evaluation Time Spent (Mins) 30   Initial Contact Note    Initial Contact Note Order Received and Verified, Physical Therapy Evaluation in Progress with Full Report to Follow.   Precautions   Precautions Posterior Hip Precautions;Weight Bearing As Tolerated Right Lower Extremity   Vitals   Pulse 76   Blood Pressure  135/78   Respiration 16   Pulse Oximetry 94 %   O2 (LPM) 0   O2 Delivery Device None - Room Air   Pain   Intervention Cold Pack;Declines   Pain 0 - 10 Group   Pain Rating Scale (NPRS) 0   Comfort Goal Comfort with Movement;Sleep Comfortably   Prior Living Situation   Prior Services None   Housing / Facility 1 Story House   Steps Into Home 1   Steps In Home 0   Equipment Owned Front-Wheel Walker   Lives with - Patient's Self Care Capacity Spouse   Prior Level of Functional Mobility   Bed Mobility Independent   Transfer Status Independent   Ambulation Independent   Ambulation Distance community   Assistive Devices Used None   Stairs Independent   History of Falls   History of Falls No   Cognition    Cognition / Consciousness WDL   Passive ROM Lower Body   Passive ROM Lower Body X   Active ROM Lower Body    Active ROM Lower Body  X   Strength Lower Body   Lower  Body Strength  X   Coordination Lower Body    Coordination Lower Body  WDL   Balance Assessment   Sitting Balance (Static) Good   Sitting Balance (Dynamic) Good   Standing Balance (Static) Fair +   Standing Balance (Dynamic) Fair +   Weight Shift Sitting Good   Weight Shift Standing Good   Gait Analysis   Gait Level Of Assist Standby Assist   Assistive Device Front Wheel Walker   Distance (Feet) 100   # of Times Distance was Traveled 1   Deviation Step To   # of Stairs Climbed 1   Level of Assist with Stairs Minimal Assist   Weight Bearing Status wbat R   Functional Mobility   Sit to Stand Standby Assist   Bed, Chair, Wheelchair Transfer Standby Assist   Transfer Method Stand Step   How much difficulty does the patient currently have...   Turning over in bed (including adjusting bedclothes, sheets and blankets)? 4   Sitting down on and standing up from a chair with arms (e.g., wheelchair, bedside commode, etc.) 4   Moving from lying on back to sitting on the side of the bed? 4   How much help from another person does the patient currently need...   Moving to and from a bed to a chair (including a wheelchair)? 4   Need to walk in a hospital room? 4   Climbing 3-5 steps with a railing? 3   6 clicks Mobility Score 23   Activity Tolerance   Sitting in Chair > 1hr   Standing 8   Patient / Family Goals    Patient / Family Goal #1 Home   Anticipated Discharge Equipment and Recommendations   DC Equipment Recommendations None   Discharge Recommendations Recommend home health for continued physical therapy services   Interdisciplinary Plan of Care Collaboration   IDT Collaboration with  Nursing   Session Information   Date / Session Number  10/20   Priority 0

## 2023-10-20 NOTE — ANESTHESIA PREPROCEDURE EVALUATION
Case: [id number] Date/Time: 10/20/23 0745    Procedure: RIGHT TOTAL HIP ARTHROPLASTY    Pre-op diagnosis: OA OF RIGHT HIP JOINT    Location:  OR  / SURGERY [facility]    Surgeons: Josh Avina M.D.        Denies: MI/CHF/smoking/CVA/DM/CKD      Relevant Problems   No relevant active problems       Physical Exam    Airway   Mallampati: II  TM distance: >3 FB  Neck ROM: full       Cardiovascular - normal exam  Rhythm: regular  Rate: normal  (-) murmur     Dental - normal exam           Pulmonary - normal exam  Breath sounds clear to auscultation     Abdominal    Neurological - normal exam                 Anesthesia Plan    ASA 2       Plan - general       Airway plan will be ETT          Induction: intravenous    Postoperative Plan: Postoperative administration of opioids is intended.    Pertinent diagnostic labs and testing reviewed    Informed Consent:    Anesthetic plan and risks discussed with patient.    Use of blood products discussed with: patient whom consented to blood products.

## 2023-10-20 NOTE — ANESTHESIA TIME REPORT
Anesthesia Start and Stop Event Times     Date Time Event    10/20/2023 0729 Ready for Procedure     0800 Anesthesia Start     0932 Anesthesia Stop        Responsible Staff  10/20/23    Name Role Begin End    Lan Esqueda M.D. Anesth 0800 0932        Overtime Reason:  no overtime (within assigned shift)    Comments:

## 2023-10-20 NOTE — OR NURSING
0930- Patient arrived from OR via gurney.  R hip dressing CDI; pedal pulse 2+. Cold pack applied to the R hip. Exparel band on L wrist.    Sedation/Resp Status: Non responsive to verbal with OPA in place. Respirations spontaneous and non-labored.    HR 53SB; VSS on 10L 02 via simple mask.  The patient does not appear to be in pain or nauseous as evidence by sleeping.     0945- OPA out for return of spontaneous eye opening/gag reflex - respirations continue spontaneous and non-labored. No pain or nausea reported. The dressing remains free of drainage.    0950- BP elevated. Stopped fluids. Trending BP Q5    0949- Called the family and gave updates    1000- No reports of pain or nausea. The dressing is still CDI    1012- Notified  about the patients elevated BP    1014- Medicated per MAR for HTN    1027- Xray tech at bedside getting imaging of the R hip    1030- No change in the patient condition. The dressing is free of drainage.     1038- CNA at bedside performing a march test. Called the family back and gave another update    1041- Medicated per MAR for nausea    1056- Med hold complete    1100- Report given to Francesca CASTILLO. The patient ambulated to phase 2 with the assistance of a CNA and walker. All belongings with the patient

## 2025-01-21 PROBLEM — M17.11 ARTHRITIS OF RIGHT KNEE: Status: ACTIVE | Noted: 2025-01-21

## 2025-01-29 ENCOUNTER — HOSPITAL ENCOUNTER (OUTPATIENT)
Dept: RADIOLOGY | Facility: MEDICAL CENTER | Age: 75
End: 2025-01-29
Attending: ORTHOPAEDIC SURGERY
Payer: MEDICARE

## 2025-01-29 DIAGNOSIS — Z01.818 PRE-OP EXAM: ICD-10-CM

## 2025-01-29 PROCEDURE — 73700 CT LOWER EXTREMITY W/O DYE: CPT | Mod: RT

## 2025-01-31 ENCOUNTER — APPOINTMENT (OUTPATIENT)
Dept: RADIOLOGY | Facility: MEDICAL CENTER | Age: 75
End: 2025-01-31
Attending: ORTHOPAEDIC SURGERY
Payer: MEDICARE

## 2025-03-07 ENCOUNTER — APPOINTMENT (OUTPATIENT)
Dept: ADMISSIONS | Facility: MEDICAL CENTER | Age: 75
End: 2025-03-07
Attending: ORTHOPAEDIC SURGERY
Payer: MEDICARE

## 2025-03-11 ENCOUNTER — PRE-ADMISSION TESTING (OUTPATIENT)
Dept: ADMISSIONS | Facility: MEDICAL CENTER | Age: 75
End: 2025-03-11
Attending: ORTHOPAEDIC SURGERY
Payer: MEDICARE

## 2025-03-11 VITALS — BODY MASS INDEX: 18.06 KG/M2 | HEIGHT: 70 IN

## 2025-03-11 DIAGNOSIS — Z01.812 PRE-OPERATIVE LABORATORY EXAMINATION: ICD-10-CM

## 2025-03-11 RX ORDER — IBUPROFEN 200 MG
200 TABLET ORAL EVERY 6 HOURS PRN
COMMUNITY

## 2025-03-11 NOTE — PREADMIT AVS NOTE
Current Medications   Medication Instructions    ibuprofen (MOTRIN) 200 MG Tab Stop 5 days before surgery    ELDERBERRY PO Stop 7 days before surgery    clindamycin (CLEOCIN) 150 MG Cap Take as needed    valACYclovir (VALTREX) 500 MG Tab Okay to take before surgery if needed    calcium carbonate (TUMS) 500 MG Chew Tab Stop 7 days before surgery    NON SPECIFIED Stop 7 days before surgery    multivitamin Tab Stop 7 days before surgery    TURMERIC CURCUMIN PO Stop 7 days before surgery    Coenzyme Q10 (COQ10 PO) Stop 7 days before surgery    APPLE CIDER VINEGAR PO Stop 7 days before surgery    Multiple Vitamins-Minerals (PRESERVISION AREDS 2 PO) Stop 7 days before surgery    Ascorbic Acid (VITAMIN C) 500 MG Cap Stop 7 days before surgery    KRILL OIL PO Stop 7 days before surgery    GRAPE SEED CR PO Stop 7 days before surgery    COLLAGEN PO Stop 7 days before surgery    ALPHA LIPOIC ACID PO Stop 7 days before surgery    ASTRAGALUS PO Stop 7 days before surgery    glucosamine Sulfate 500 MG Cap Stop 7 days before surgery    NON SPECIFIED Stop 7 days before surgery

## 2025-03-20 ENCOUNTER — APPOINTMENT (OUTPATIENT)
Dept: ADMISSIONS | Facility: MEDICAL CENTER | Age: 75
End: 2025-03-20
Attending: ORTHOPAEDIC SURGERY
Payer: MEDICARE

## 2025-03-20 VITALS — HEIGHT: 70 IN | BODY MASS INDEX: 16.41 KG/M2 | WEIGHT: 114.64 LBS

## 2025-03-20 DIAGNOSIS — Z01.812 PRE-OPERATIVE LABORATORY EXAMINATION: ICD-10-CM

## 2025-03-20 LAB
ALBUMIN SERPL BCP-MCNC: 4.3 G/DL (ref 3.2–4.9)
ALBUMIN/GLOB SERPL: 1.7 G/DL
ALP SERPL-CCNC: 84 U/L (ref 30–99)
ALT SERPL-CCNC: 16 U/L (ref 2–50)
ANION GAP SERPL CALC-SCNC: 11 MMOL/L (ref 7–16)
AST SERPL-CCNC: 24 U/L (ref 12–45)
BILIRUB SERPL-MCNC: 0.5 MG/DL (ref 0.1–1.5)
BUN SERPL-MCNC: 23 MG/DL (ref 8–22)
CALCIUM ALBUM COR SERPL-MCNC: 8.8 MG/DL (ref 8.5–10.5)
CALCIUM SERPL-MCNC: 9 MG/DL (ref 8.4–10.2)
CHLORIDE SERPL-SCNC: 104 MMOL/L (ref 96–112)
CO2 SERPL-SCNC: 25 MMOL/L (ref 20–33)
CREAT SERPL-MCNC: 0.73 MG/DL (ref 0.5–1.4)
ERYTHROCYTE [DISTWIDTH] IN BLOOD BY AUTOMATED COUNT: 41.2 FL (ref 35.9–50)
GFR SERPLBLD CREATININE-BSD FMLA CKD-EPI: 87 ML/MIN/1.73 M 2
GLOBULIN SER CALC-MCNC: 2.5 G/DL (ref 1.9–3.5)
GLUCOSE SERPL-MCNC: 94 MG/DL (ref 65–99)
HCT VFR BLD AUTO: 42.2 % (ref 37–47)
HGB BLD-MCNC: 14.3 G/DL (ref 12–16)
MCH RBC QN AUTO: 31.5 PG (ref 27–33)
MCHC RBC AUTO-ENTMCNC: 33.9 G/DL (ref 32.2–35.5)
MCV RBC AUTO: 93 FL (ref 81.4–97.8)
PLATELET # BLD AUTO: 277 K/UL (ref 164–446)
PMV BLD AUTO: 10.2 FL (ref 9–12.9)
POTASSIUM SERPL-SCNC: 4.3 MMOL/L (ref 3.6–5.5)
PROT SERPL-MCNC: 6.8 G/DL (ref 6–8.2)
RBC # BLD AUTO: 4.54 M/UL (ref 4.2–5.4)
SCCMEC + MECA PNL NOSE NAA+PROBE: NEGATIVE
SCCMEC + MECA PNL NOSE NAA+PROBE: NEGATIVE
SODIUM SERPL-SCNC: 140 MMOL/L (ref 135–145)
WBC # BLD AUTO: 7 K/UL (ref 4.8–10.8)

## 2025-03-20 PROCEDURE — 80053 COMPREHEN METABOLIC PANEL: CPT

## 2025-03-20 PROCEDURE — 87640 STAPH A DNA AMP PROBE: CPT | Mod: XU

## 2025-03-20 PROCEDURE — 85027 COMPLETE CBC AUTOMATED: CPT | Mod: GA

## 2025-03-20 PROCEDURE — 87641 MR-STAPH DNA AMP PROBE: CPT

## 2025-03-20 PROCEDURE — 36415 COLL VENOUS BLD VENIPUNCTURE: CPT | Mod: GA

## 2025-03-20 NOTE — DISCHARGE PLANNING
DISCHARGE PLANNING NOTE - TOTAL JOINT    Procedure: RIGHT TOTAL KNEE ARTHROPLASTY  Procedure Date: 4/7/2025  Insurance: Payor: MEDICARE / Plan: MEDICARE PART A & B / Product Type: *No Product type* /    Equipment currently available at home?  crutches, front-wheel walker, raised toilet seat, and Hand held shower head, Ice machine  Steps into the home? 1  Steps within the home? 1 flight   Toilet height? Standard w/ seat riser  Type of shower? tub-shower  Home Oxygen? No  Portable tank?    Oxygen Provider:  Planning same day discharge: Unsure    Is Outpatient Physical Therapy set up after surgery? Yes  Did you take the Total Joint Class and where or did you receive an Educational booklet? Yes, received NAON book.  Who will be your transportation home on day of discharge?     Have you made arrangements to have someone stay with you at home for the first 3 days following discharge, and if so, whom?     Have you notified your surgeon that you do not have transportation or someone to help you after discharge? N/A    Are you planning on going to a transitional care facility, for example a skilled nursing facility, post operatively for rehab, and if so, have you contacted your insurance plan to see if they cover this? No      Met with the pt. Pt given a copy of Home Safety Checklist, Equipment Resource Guide, CHG scrub kit and instructions. Expected process in Recovery Room and dc criteria discussed with pt. All questions answered and verbalizes understanding of all instructions. No dc needs identified at this time. Anticipate dc to home without barriers. MRSA swab obtained.

## 2025-04-02 RX ORDER — COLLAGEN, HYDROLYSATE (BOVINE) 100 %
POWDER (GRAM) MISCELLANEOUS DAILY
COMMUNITY

## 2025-04-03 NOTE — H&P (VIEW-ONLY)
Jessica Darden is a 75 y.o. female  here today for further discussion of their posttraumatic right knee arthritis and right knee pain.  They have tried and failed most all conservative measures and are ready to move forward with surgery in the form of a total knee arthroplasty.  They have obtained the appropriate clearances if necessary.  Patient has been seen and cleared by their primary care doctor they have pain on a daily basis, pain at night, pain that affects her activities of daily living.  They have no history of blood clots.  They are ready to move forward with surgery.  We are planning on using [place] triathlon cemented, PS versus TS, stemmed and constrained/hinge components available-[facility] as their implants.  They will be having their surgery at [facility].      Past Medical History:   Diagnosis Date    Arthritis     hip    Cancer (HCC)     basal cell skin cancer    Cataract     IOL left eye    Hip pain     right     Past Surgical History:   Procedure Laterality Date    KS TOTAL HIP ARTHROPLASTY Right 10/20/2023    Procedure: RIGHT TOTAL HIP ARTHROPLASTY, CYST EXCISION, AND BONE GRAFT OF THE HILIUM;  Surgeon: Josh Avina M.D.;  Location: SURGERY [facility];  Service: Orthopedics    CATARACT EXTRACTION WITH IOL Right     HARDWARE REMOVAL ORTHO      tibia    MAMMOPLASTY AUGMENTATION      ORIF, FRACTURE, TIBIA Right     ORIF    RETINAL DETACHMENT REPAIR Left      Social Connections: Not on File (8/24/2019)    Received from [facility]    Social Connections     Social Connections and Isolation: 0       Current Outpatient Medications:     Collagen Hydrolysate, every day.    ibuprofen, 200 mg, Oral, Q6HRS PRN    ELDERBERRY PO, Take  by mouth.    clindamycin, Take 4 capsules by mouth 1 hour before dental appt.    valACYclovir, TAKE 4 TABLETS BY MOUTH AT FIRST SIGN OF ATTACK AND 4 TABLETS 12 HOURS LATER    calcium carbonate, 500 mg, Oral, PRN    NON SPECIFIED, every evening.  Strontium, OTC supplement for bone health    multivitamin, 1 Tablet, Oral, DAILY    TURMERIC CURCUMIN PO, Take  by mouth every day.    Coenzyme Q10 (COQ10 PO), Take  by mouth every day.    APPLE CIDER VINEGAR PO, Take  by mouth every day.    Multiple Vitamins-Minerals (PRESERVISION AREDS 2 PO), Take  by mouth every day.    Vitamin C, Take  by mouth every day.    KRILL OIL PO, Take  by mouth every day.    GRAPE SEED CR PO, Take  by mouth every day.    COLLAGEN PO, Take  by mouth every day.    ALPHA LIPOIC ACID PO, Take  by mouth every day.    ASTRAGALUS PO, Take  by mouth every day.    glucosamine Sulfate, 500 mg, Oral, DAILY    NON SPECIFIED, every day. Algae, Magnesium, Vitamin d, k2  Algeacal  Penicillins      Past medical history social history surgical history review of systems reviewed and is otherwise noncontributory except for above    Exam    Alert and oriented x3, no apparent distress  Normocephalic atraumatic, trachea midline  Breathing is nonlabored  Abdomen is nonacute  Neurovascularly intact in bilateral lower extremities.  Soft Calf Compartments without signs of DVT.  Right lower extremity: Well-healed incision consistent with her tibial ORIF and subsequent hardware removal many years ago  Deformity: Significant valgus deformity, laxity to varus stress  ROM 10 to 110 degrees tested actively  Pain with forced flexion: Present  Pain with patellofemoral grind: Present  Pain with palpation of tibiofemoral joint line: Present     Left lower extremity: Relatively benign left knee exam     +DF/PF/EHL Bilaterally; sensation intact to light touch  Some areas of patchy numbness around her incision sites laterally    Images    All images were reviewed which are appropriate for templating purposes.  They show evidence of joint space narrowing, osteophyte formation, subchondral sclerosis.    Impression    1) right knee pain  2) severe posttraumatic right knee arthritis with a history of a bicondylar tibial plateau  fracture with ORIF in 2006    Plan    All the risk benefits and side effects of surgery were discussed including pain, bleeding, infection, injury to surrounding organs vessels, heart attack, stroke, fracture, dislocation, need for further surgery, and possibly death.  We will plan on going forward with a total knee arthroplasty.  We will plan on using aspirin for DVT prophylaxis.  Patient will get all of their postoperative prescriptions today.  Patient will plan on going home the same day if doing well.  Patient may be placed in extended recovery to allow for clearance from physical therapy and pain control.   We will plan on using [place] triathlon cemented, PS versus TS, stemmed and constrained/hinge components available as their surgical implants.  All of their questions were answered and they agreed expressed full understanding.  We will see them on the day of surgery.  They will get a call from our surgical team about start time and arrival time of surgery.

## 2025-04-05 ENCOUNTER — ANESTHESIA EVENT (OUTPATIENT)
Dept: SURGERY | Facility: MEDICAL CENTER | Age: 75
End: 2025-04-05
Payer: MEDICARE

## 2025-04-07 ENCOUNTER — APPOINTMENT (OUTPATIENT)
Dept: RADIOLOGY | Facility: MEDICAL CENTER | Age: 75
End: 2025-04-07
Attending: PHYSICIAN ASSISTANT
Payer: MEDICARE

## 2025-04-07 ENCOUNTER — ANESTHESIA (OUTPATIENT)
Dept: SURGERY | Facility: MEDICAL CENTER | Age: 75
End: 2025-04-07
Payer: MEDICARE

## 2025-04-07 ENCOUNTER — HOSPITAL ENCOUNTER (OUTPATIENT)
Facility: MEDICAL CENTER | Age: 75
End: 2025-04-07
Attending: ORTHOPAEDIC SURGERY | Admitting: ORTHOPAEDIC SURGERY
Payer: MEDICARE

## 2025-04-07 VITALS
SYSTOLIC BLOOD PRESSURE: 135 MMHG | WEIGHT: 122.47 LBS | BODY MASS INDEX: 17.53 KG/M2 | HEART RATE: 62 BPM | OXYGEN SATURATION: 95 % | RESPIRATION RATE: 16 BRPM | DIASTOLIC BLOOD PRESSURE: 56 MMHG | HEIGHT: 70 IN | TEMPERATURE: 96.8 F

## 2025-04-07 DIAGNOSIS — M17.11 ARTHRITIS OF RIGHT KNEE: ICD-10-CM

## 2025-04-07 PROCEDURE — 20985 CPTR-ASST DIR MS PX: CPT | Mod: ASROC | Performed by: PHYSICIAN ASSISTANT

## 2025-04-07 PROCEDURE — 700111 HCHG RX REV CODE 636 W/ 250 OVERRIDE (IP): Performed by: ORTHOPAEDIC SURGERY

## 2025-04-07 PROCEDURE — 160048 HCHG OR STATISTICAL LEVEL 1-5: Performed by: ORTHOPAEDIC SURGERY

## 2025-04-07 PROCEDURE — 700101 HCHG RX REV CODE 250: Performed by: ORTHOPAEDIC SURGERY

## 2025-04-07 PROCEDURE — 700105 HCHG RX REV CODE 258: Performed by: STUDENT IN AN ORGANIZED HEALTH CARE EDUCATION/TRAINING PROGRAM

## 2025-04-07 PROCEDURE — 160041 HCHG SURGERY MINUTES - EA ADDL 1 MIN LEVEL 4: Performed by: ORTHOPAEDIC SURGERY

## 2025-04-07 PROCEDURE — 160036 HCHG PACU - EA ADDL 30 MINS PHASE I: Performed by: ORTHOPAEDIC SURGERY

## 2025-04-07 PROCEDURE — 27447 TOTAL KNEE ARTHROPLASTY: CPT | Mod: ASROC,RT | Performed by: PHYSICIAN ASSISTANT

## 2025-04-07 PROCEDURE — 160046 HCHG PACU - 1ST 60 MINS PHASE II: Performed by: ORTHOPAEDIC SURGERY

## 2025-04-07 PROCEDURE — 700102 HCHG RX REV CODE 250 W/ 637 OVERRIDE(OP): Performed by: STUDENT IN AN ORGANIZED HEALTH CARE EDUCATION/TRAINING PROGRAM

## 2025-04-07 PROCEDURE — 160025 RECOVERY II MINUTES (STATS): Performed by: ORTHOPAEDIC SURGERY

## 2025-04-07 PROCEDURE — 64447 NJX AA&/STRD FEMORAL NRV IMG: CPT | Performed by: ORTHOPAEDIC SURGERY

## 2025-04-07 PROCEDURE — 97161 PT EVAL LOW COMPLEX 20 MIN: CPT

## 2025-04-07 PROCEDURE — C1713 ANCHOR/SCREW BN/BN,TIS/BN: HCPCS | Performed by: ORTHOPAEDIC SURGERY

## 2025-04-07 PROCEDURE — 160002 HCHG RECOVERY MINUTES (STAT): Performed by: ORTHOPAEDIC SURGERY

## 2025-04-07 PROCEDURE — 160015 HCHG STAT PREOP MINUTES: Performed by: ORTHOPAEDIC SURGERY

## 2025-04-07 PROCEDURE — 27447 TOTAL KNEE ARTHROPLASTY: CPT | Mod: RT | Performed by: ORTHOPAEDIC SURGERY

## 2025-04-07 PROCEDURE — 97116 GAIT TRAINING THERAPY: CPT

## 2025-04-07 PROCEDURE — 700111 HCHG RX REV CODE 636 W/ 250 OVERRIDE (IP): Mod: JZ | Performed by: STUDENT IN AN ORGANIZED HEALTH CARE EDUCATION/TRAINING PROGRAM

## 2025-04-07 PROCEDURE — 97110 THERAPEUTIC EXERCISES: CPT

## 2025-04-07 PROCEDURE — 73560 X-RAY EXAM OF KNEE 1 OR 2: CPT | Mod: RT

## 2025-04-07 PROCEDURE — 700101 HCHG RX REV CODE 250: Performed by: STUDENT IN AN ORGANIZED HEALTH CARE EDUCATION/TRAINING PROGRAM

## 2025-04-07 PROCEDURE — 160047 HCHG PACU  - EA ADDL 30 MINS PHASE II: Performed by: ORTHOPAEDIC SURGERY

## 2025-04-07 PROCEDURE — A9270 NON-COVERED ITEM OR SERVICE: HCPCS | Performed by: STUDENT IN AN ORGANIZED HEALTH CARE EDUCATION/TRAINING PROGRAM

## 2025-04-07 PROCEDURE — 160009 HCHG ANES TIME/MIN: Performed by: ORTHOPAEDIC SURGERY

## 2025-04-07 PROCEDURE — 160029 HCHG SURGERY MINUTES - 1ST 30 MINS LEVEL 4: Performed by: ORTHOPAEDIC SURGERY

## 2025-04-07 PROCEDURE — 20985 CPTR-ASST DIR MS PX: CPT | Performed by: ORTHOPAEDIC SURGERY

## 2025-04-07 PROCEDURE — 160035 HCHG PACU - 1ST 60 MINS PHASE I: Performed by: ORTHOPAEDIC SURGERY

## 2025-04-07 PROCEDURE — C1776 JOINT DEVICE (IMPLANTABLE): HCPCS | Performed by: ORTHOPAEDIC SURGERY

## 2025-04-07 PROCEDURE — 700111 HCHG RX REV CODE 636 W/ 250 OVERRIDE (IP): Performed by: STUDENT IN AN ORGANIZED HEALTH CARE EDUCATION/TRAINING PROGRAM

## 2025-04-07 DEVICE — BASEPLATE TIBIAL TRIATHLON COCR SIZE 5 (1EA): Type: IMPLANTABLE DEVICE | Site: KNEE | Status: FUNCTIONAL

## 2025-04-07 DEVICE — COMPONENT PATELLAR TRIATHLON X3 ASYMMETRIC A32 10MM (1EA): Type: IMPLANTABLE DEVICE | Site: KNEE | Status: FUNCTIONAL

## 2025-04-07 DEVICE — IMPLANTABLE DEVICE: Type: IMPLANTABLE DEVICE | Site: KNEE | Status: FUNCTIONAL

## 2025-04-07 DEVICE — CEMENT ORTHOPEDIC HV US (10/PK): Type: IMPLANTABLE DEVICE | Site: KNEE | Status: FUNCTIONAL

## 2025-04-07 RX ORDER — SODIUM CHLORIDE 9 MG/ML
INJECTION, SOLUTION INTRAMUSCULAR; INTRAVENOUS; SUBCUTANEOUS
Status: DISCONTINUED | OUTPATIENT
Start: 2025-04-07 | End: 2025-04-07 | Stop reason: HOSPADM

## 2025-04-07 RX ORDER — ONDANSETRON 4 MG/1
4 TABLET, ORALLY DISINTEGRATING ORAL EVERY 6 HOURS PRN
Status: CANCELLED | OUTPATIENT
Start: 2025-04-07

## 2025-04-07 RX ORDER — KETOROLAC TROMETHAMINE 15 MG/ML
15 INJECTION, SOLUTION INTRAMUSCULAR; INTRAVENOUS EVERY 6 HOURS
Status: CANCELLED | OUTPATIENT
Start: 2025-04-07 | End: 2025-04-10

## 2025-04-07 RX ORDER — OXYCODONE HCL 5 MG/5 ML
5 SOLUTION, ORAL ORAL
Status: COMPLETED | OUTPATIENT
Start: 2025-04-07 | End: 2025-04-07

## 2025-04-07 RX ORDER — OXYCODONE HYDROCHLORIDE 5 MG/1
5 TABLET ORAL
Refills: 0 | Status: CANCELLED | OUTPATIENT
Start: 2025-04-07

## 2025-04-07 RX ORDER — HYDRALAZINE HYDROCHLORIDE 20 MG/ML
5 INJECTION INTRAMUSCULAR; INTRAVENOUS
Status: DISCONTINUED | OUTPATIENT
Start: 2025-04-07 | End: 2025-04-07 | Stop reason: HOSPADM

## 2025-04-07 RX ORDER — OXYCODONE HYDROCHLORIDE 10 MG/1
10 TABLET ORAL
Refills: 0 | Status: CANCELLED | OUTPATIENT
Start: 2025-04-07

## 2025-04-07 RX ORDER — HYDROMORPHONE HYDROCHLORIDE 1 MG/ML
0.1 INJECTION, SOLUTION INTRAMUSCULAR; INTRAVENOUS; SUBCUTANEOUS
Status: DISCONTINUED | OUTPATIENT
Start: 2025-04-07 | End: 2025-04-07 | Stop reason: HOSPADM

## 2025-04-07 RX ORDER — ACETAMINOPHEN 325 MG/1
650 TABLET ORAL EVERY 6 HOURS
Status: CANCELLED | OUTPATIENT
Start: 2025-04-07 | End: 2025-04-12

## 2025-04-07 RX ORDER — OMEPRAZOLE 20 MG/1
20 CAPSULE, DELAYED RELEASE ORAL 2 TIMES DAILY PRN
Status: CANCELLED | OUTPATIENT
Start: 2025-04-07 | End: 2025-04-17

## 2025-04-07 RX ORDER — DIPHENHYDRAMINE HYDROCHLORIDE 50 MG/ML
25 INJECTION, SOLUTION INTRAMUSCULAR; INTRAVENOUS EVERY 6 HOURS PRN
Status: CANCELLED | OUTPATIENT
Start: 2025-04-07

## 2025-04-07 RX ORDER — AMOXICILLIN 250 MG
1 CAPSULE ORAL
Status: CANCELLED | OUTPATIENT
Start: 2025-04-07

## 2025-04-07 RX ORDER — DEXTROSE MONOHYDRATE, SODIUM CHLORIDE, AND POTASSIUM CHLORIDE 50; 1.49; 4.5 G/1000ML; G/1000ML; G/1000ML
INJECTION, SOLUTION INTRAVENOUS CONTINUOUS
Status: CANCELLED | OUTPATIENT
Start: 2025-04-07 | End: 2025-04-07

## 2025-04-07 RX ORDER — ACETAMINOPHEN 500 MG
1000 TABLET ORAL ONCE
Status: COMPLETED | OUTPATIENT
Start: 2025-04-07 | End: 2025-04-07

## 2025-04-07 RX ORDER — SCOPOLAMINE 1 MG/3D
1 PATCH, EXTENDED RELEASE TRANSDERMAL
Status: CANCELLED | OUTPATIENT
Start: 2025-04-07

## 2025-04-07 RX ORDER — HALOPERIDOL 5 MG/ML
1 INJECTION INTRAMUSCULAR EVERY 6 HOURS PRN
Status: CANCELLED | OUTPATIENT
Start: 2025-04-07

## 2025-04-07 RX ORDER — MIDAZOLAM HYDROCHLORIDE 1 MG/ML
INJECTION INTRAMUSCULAR; INTRAVENOUS PRN
Status: DISCONTINUED | OUTPATIENT
Start: 2025-04-07 | End: 2025-04-07 | Stop reason: SURG

## 2025-04-07 RX ORDER — BUPIVACAINE HYDROCHLORIDE 5 MG/ML
INJECTION, SOLUTION EPIDURAL; INTRACAUDAL; PERINEURAL
Status: COMPLETED | OUTPATIENT
Start: 2025-04-07 | End: 2025-04-07

## 2025-04-07 RX ORDER — TAMSULOSIN HYDROCHLORIDE 0.4 MG/1
0.4 CAPSULE ORAL
Status: CANCELLED | OUTPATIENT
Start: 2025-04-07

## 2025-04-07 RX ORDER — EPHEDRINE SULFATE 50 MG/ML
INJECTION, SOLUTION INTRAVENOUS PRN
Status: DISCONTINUED | OUTPATIENT
Start: 2025-04-07 | End: 2025-04-07 | Stop reason: SURG

## 2025-04-07 RX ORDER — BISACODYL 10 MG
10 SUPPOSITORY, RECTAL RECTAL
Status: CANCELLED | OUTPATIENT
Start: 2025-04-07

## 2025-04-07 RX ORDER — DIPHENHYDRAMINE HYDROCHLORIDE 50 MG/ML
12.5 INJECTION, SOLUTION INTRAMUSCULAR; INTRAVENOUS
Status: DISCONTINUED | OUTPATIENT
Start: 2025-04-07 | End: 2025-04-07 | Stop reason: HOSPADM

## 2025-04-07 RX ORDER — ROPIVACAINE HYDROCHLORIDE 5 MG/ML
INJECTION, SOLUTION EPIDURAL; INFILTRATION; PERINEURAL
Status: DISCONTINUED | OUTPATIENT
Start: 2025-04-07 | End: 2025-04-07 | Stop reason: HOSPADM

## 2025-04-07 RX ORDER — IBUPROFEN 400 MG/1
800 TABLET, FILM COATED ORAL 3 TIMES DAILY PRN
Status: CANCELLED | OUTPATIENT
Start: 2025-04-10

## 2025-04-07 RX ORDER — TRAMADOL HYDROCHLORIDE 50 MG/1
50 TABLET ORAL EVERY 4 HOURS PRN
Status: CANCELLED | OUTPATIENT
Start: 2025-04-07

## 2025-04-07 RX ORDER — SODIUM PHOSPHATE,MONO-DIBASIC 19G-7G/118
1 ENEMA (ML) RECTAL
Status: CANCELLED | OUTPATIENT
Start: 2025-04-07

## 2025-04-07 RX ORDER — DEXAMETHASONE SODIUM PHOSPHATE 4 MG/ML
4 INJECTION, SOLUTION INTRA-ARTICULAR; INTRALESIONAL; INTRAMUSCULAR; INTRAVENOUS; SOFT TISSUE
Status: CANCELLED | OUTPATIENT
Start: 2025-04-07

## 2025-04-07 RX ORDER — DOCUSATE SODIUM 100 MG/1
100 CAPSULE, LIQUID FILLED ORAL 2 TIMES DAILY
Status: CANCELLED | OUTPATIENT
Start: 2025-04-07

## 2025-04-07 RX ORDER — KETOROLAC TROMETHAMINE 30 MG/ML
INJECTION, SOLUTION INTRAMUSCULAR; INTRAVENOUS
Status: DISCONTINUED | OUTPATIENT
Start: 2025-04-07 | End: 2025-04-07 | Stop reason: HOSPADM

## 2025-04-07 RX ORDER — AMOXICILLIN 250 MG
1 CAPSULE ORAL NIGHTLY
Status: CANCELLED | OUTPATIENT
Start: 2025-04-07

## 2025-04-07 RX ORDER — ONDANSETRON 2 MG/ML
INJECTION INTRAMUSCULAR; INTRAVENOUS PRN
Status: DISCONTINUED | OUTPATIENT
Start: 2025-04-07 | End: 2025-04-07 | Stop reason: SURG

## 2025-04-07 RX ORDER — HYDROMORPHONE HYDROCHLORIDE 1 MG/ML
0.5 INJECTION, SOLUTION INTRAMUSCULAR; INTRAVENOUS; SUBCUTANEOUS
Status: CANCELLED | OUTPATIENT
Start: 2025-04-07

## 2025-04-07 RX ORDER — EPINEPHRINE 1 MG/ML(1)
AMPUL (ML) INJECTION
Status: DISCONTINUED | OUTPATIENT
Start: 2025-04-07 | End: 2025-04-07 | Stop reason: HOSPADM

## 2025-04-07 RX ORDER — OXYCODONE HCL 10 MG/1
10 TABLET, FILM COATED, EXTENDED RELEASE ORAL ONCE
Status: COMPLETED | OUTPATIENT
Start: 2025-04-07 | End: 2025-04-07

## 2025-04-07 RX ORDER — CELECOXIB 200 MG/1
200 CAPSULE ORAL ONCE
Status: COMPLETED | OUTPATIENT
Start: 2025-04-07 | End: 2025-04-07

## 2025-04-07 RX ORDER — DEXAMETHASONE SODIUM PHOSPHATE 4 MG/ML
10 INJECTION, SOLUTION INTRA-ARTICULAR; INTRALESIONAL; INTRAMUSCULAR; INTRAVENOUS; SOFT TISSUE ONCE
Status: CANCELLED | OUTPATIENT
Start: 2025-04-08 | End: 2025-04-08

## 2025-04-07 RX ORDER — ALBUTEROL SULFATE 5 MG/ML
2.5 SOLUTION RESPIRATORY (INHALATION)
Status: DISCONTINUED | OUTPATIENT
Start: 2025-04-07 | End: 2025-04-07 | Stop reason: HOSPADM

## 2025-04-07 RX ORDER — POLYETHYLENE GLYCOL 3350 17 G/17G
1 POWDER, FOR SOLUTION ORAL 2 TIMES DAILY PRN
Status: CANCELLED | OUTPATIENT
Start: 2025-04-07

## 2025-04-07 RX ORDER — HYDROMORPHONE HYDROCHLORIDE 1 MG/ML
0.2 INJECTION, SOLUTION INTRAMUSCULAR; INTRAVENOUS; SUBCUTANEOUS
Status: DISCONTINUED | OUTPATIENT
Start: 2025-04-07 | End: 2025-04-07 | Stop reason: HOSPADM

## 2025-04-07 RX ORDER — SODIUM CHLORIDE, SODIUM LACTATE, POTASSIUM CHLORIDE, CALCIUM CHLORIDE 600; 310; 30; 20 MG/100ML; MG/100ML; MG/100ML; MG/100ML
INJECTION, SOLUTION INTRAVENOUS
Status: DISCONTINUED | OUTPATIENT
Start: 2025-04-07 | End: 2025-04-07 | Stop reason: SURG

## 2025-04-07 RX ORDER — CEFAZOLIN SODIUM 1 G/3ML
2 INJECTION, POWDER, FOR SOLUTION INTRAMUSCULAR; INTRAVENOUS ONCE
Status: COMPLETED | OUTPATIENT
Start: 2025-04-07 | End: 2025-04-07

## 2025-04-07 RX ORDER — DEXAMETHASONE SODIUM PHOSPHATE 4 MG/ML
INJECTION, SOLUTION INTRA-ARTICULAR; INTRALESIONAL; INTRAMUSCULAR; INTRAVENOUS; SOFT TISSUE PRN
Status: DISCONTINUED | OUTPATIENT
Start: 2025-04-07 | End: 2025-04-07 | Stop reason: SURG

## 2025-04-07 RX ORDER — ASPIRIN 81 MG/1
81 TABLET ORAL 2 TIMES DAILY
Status: CANCELLED | OUTPATIENT
Start: 2025-04-07

## 2025-04-07 RX ORDER — DIPHENHYDRAMINE HCL 25 MG
25 TABLET ORAL EVERY 6 HOURS PRN
Status: CANCELLED | OUTPATIENT
Start: 2025-04-07

## 2025-04-07 RX ORDER — HALOPERIDOL 5 MG/ML
1 INJECTION INTRAMUSCULAR
Status: DISCONTINUED | OUTPATIENT
Start: 2025-04-07 | End: 2025-04-07 | Stop reason: HOSPADM

## 2025-04-07 RX ORDER — DIPHENHYDRAMINE HCL 25 MG
25 TABLET ORAL NIGHTLY PRN
Status: CANCELLED | OUTPATIENT
Start: 2025-04-08

## 2025-04-07 RX ORDER — SODIUM CHLORIDE, SODIUM LACTATE, POTASSIUM CHLORIDE, CALCIUM CHLORIDE 600; 310; 30; 20 MG/100ML; MG/100ML; MG/100ML; MG/100ML
INJECTION, SOLUTION INTRAVENOUS CONTINUOUS
Status: DISCONTINUED | OUTPATIENT
Start: 2025-04-07 | End: 2025-04-07 | Stop reason: HOSPADM

## 2025-04-07 RX ORDER — ONDANSETRON 2 MG/ML
4 INJECTION INTRAMUSCULAR; INTRAVENOUS EVERY 4 HOURS PRN
Status: CANCELLED | OUTPATIENT
Start: 2025-04-07

## 2025-04-07 RX ORDER — OXYCODONE HCL 5 MG/5 ML
10 SOLUTION, ORAL ORAL
Status: COMPLETED | OUTPATIENT
Start: 2025-04-07 | End: 2025-04-07

## 2025-04-07 RX ORDER — LIDOCAINE HYDROCHLORIDE 20 MG/ML
INJECTION, SOLUTION EPIDURAL; INFILTRATION; INTRACAUDAL; PERINEURAL PRN
Status: DISCONTINUED | OUTPATIENT
Start: 2025-04-07 | End: 2025-04-07 | Stop reason: SURG

## 2025-04-07 RX ORDER — EPHEDRINE SULFATE 50 MG/ML
5 INJECTION, SOLUTION INTRAVENOUS
Status: DISCONTINUED | OUTPATIENT
Start: 2025-04-07 | End: 2025-04-07 | Stop reason: HOSPADM

## 2025-04-07 RX ORDER — TRANEXAMIC ACID 100 MG/ML
1000 INJECTION, SOLUTION INTRAVENOUS ONCE
Status: COMPLETED | OUTPATIENT
Start: 2025-04-07 | End: 2025-04-07

## 2025-04-07 RX ORDER — ONDANSETRON 2 MG/ML
4 INJECTION INTRAMUSCULAR; INTRAVENOUS
Status: COMPLETED | OUTPATIENT
Start: 2025-04-07 | End: 2025-04-07

## 2025-04-07 RX ORDER — ACETAMINOPHEN 325 MG/1
650 TABLET ORAL EVERY 6 HOURS PRN
Status: CANCELLED | OUTPATIENT
Start: 2025-04-12

## 2025-04-07 RX ORDER — HYDROMORPHONE HYDROCHLORIDE 1 MG/ML
0.4 INJECTION, SOLUTION INTRAMUSCULAR; INTRAVENOUS; SUBCUTANEOUS
Status: DISCONTINUED | OUTPATIENT
Start: 2025-04-07 | End: 2025-04-07 | Stop reason: HOSPADM

## 2025-04-07 RX ADMIN — DEXAMETHASONE SODIUM PHOSPHATE 8 MG: 4 INJECTION INTRA-ARTICULAR; INTRALESIONAL; INTRAMUSCULAR; INTRAVENOUS; SOFT TISSUE at 11:35

## 2025-04-07 RX ADMIN — ACETAMINOPHEN 1000 MG: 500 TABLET ORAL at 09:40

## 2025-04-07 RX ADMIN — FENTANYL CITRATE 50 MCG: 50 INJECTION, SOLUTION INTRAMUSCULAR; INTRAVENOUS at 11:29

## 2025-04-07 RX ADMIN — OXYCODONE HYDROCHLORIDE 5 MG: 5 SOLUTION ORAL at 13:49

## 2025-04-07 RX ADMIN — ONDANSETRON 4 MG: 2 INJECTION INTRAMUSCULAR; INTRAVENOUS at 13:49

## 2025-04-07 RX ADMIN — EPHEDRINE SULFATE 10 MG: 50 INJECTION, SOLUTION INTRAVENOUS at 11:43

## 2025-04-07 RX ADMIN — CELECOXIB 200 MG: 200 CAPSULE ORAL at 09:40

## 2025-04-07 RX ADMIN — FENTANYL CITRATE 50 MCG: 50 INJECTION, SOLUTION INTRAMUSCULAR; INTRAVENOUS at 12:11

## 2025-04-07 RX ADMIN — PROPOFOL 20 MG: 10 INJECTION, EMULSION INTRAVENOUS at 11:31

## 2025-04-07 RX ADMIN — LIDOCAINE HYDROCHLORIDE 60 MG: 20 INJECTION, SOLUTION EPIDURAL; INFILTRATION; INTRACAUDAL; PERINEURAL at 11:29

## 2025-04-07 RX ADMIN — TRANEXAMIC ACID 1000 MG: 100 INJECTION, SOLUTION INTRAVENOUS at 12:34

## 2025-04-07 RX ADMIN — FENTANYL CITRATE 25 MCG: 50 INJECTION, SOLUTION INTRAMUSCULAR; INTRAVENOUS at 13:49

## 2025-04-07 RX ADMIN — BUPIVACAINE HYDROCHLORIDE 20 ML: 5 INJECTION, SOLUTION EPIDURAL; INTRACAUDAL at 10:45

## 2025-04-07 RX ADMIN — ONDANSETRON 4 MG: 2 INJECTION INTRAMUSCULAR; INTRAVENOUS at 12:46

## 2025-04-07 RX ADMIN — OXYCODONE HYDROCHLORIDE 10 MG: 10 TABLET, FILM COATED, EXTENDED RELEASE ORAL at 09:40

## 2025-04-07 RX ADMIN — PROPOFOL 30 MG: 10 INJECTION, EMULSION INTRAVENOUS at 11:36

## 2025-04-07 RX ADMIN — SODIUM CHLORIDE, POTASSIUM CHLORIDE, SODIUM LACTATE AND CALCIUM CHLORIDE: 600; 310; 30; 20 INJECTION, SOLUTION INTRAVENOUS at 11:27

## 2025-04-07 RX ADMIN — TRANEXAMIC ACID 1000 MG: 100 INJECTION, SOLUTION INTRAVENOUS at 11:37

## 2025-04-07 RX ADMIN — FENTANYL CITRATE 50 MCG: 50 INJECTION, SOLUTION INTRAMUSCULAR; INTRAVENOUS at 12:19

## 2025-04-07 RX ADMIN — EPHEDRINE SULFATE 10 MG: 50 INJECTION, SOLUTION INTRAVENOUS at 11:42

## 2025-04-07 RX ADMIN — GLYCOPYRROLATE 0.2 MG: 0.2 INJECTION INTRAMUSCULAR; INTRAVENOUS at 12:04

## 2025-04-07 RX ADMIN — MIDAZOLAM HYDROCHLORIDE 2 MG: 1 INJECTION, SOLUTION INTRAMUSCULAR; INTRAVENOUS at 10:45

## 2025-04-07 RX ADMIN — FENTANYL CITRATE 50 MCG: 50 INJECTION, SOLUTION INTRAMUSCULAR; INTRAVENOUS at 11:36

## 2025-04-07 RX ADMIN — CEFAZOLIN 2 G: 1 INJECTION, POWDER, FOR SOLUTION INTRAMUSCULAR; INTRAVENOUS at 11:31

## 2025-04-07 ASSESSMENT — PAIN DESCRIPTION - PAIN TYPE
TYPE: SURGICAL PAIN
TYPE: SURGICAL PAIN
TYPE: CHRONIC PAIN;SURGICAL PAIN
TYPE: SURGICAL PAIN
TYPE: CHRONIC PAIN

## 2025-04-07 ASSESSMENT — PAIN SCALES - GENERAL: PAIN_LEVEL: 1

## 2025-04-07 ASSESSMENT — COGNITIVE AND FUNCTIONAL STATUS - GENERAL
SUGGESTED CMS G CODE MODIFIER MOBILITY: CJ
CLIMB 3 TO 5 STEPS WITH RAILING: A LITTLE
MOBILITY SCORE: 22
WALKING IN HOSPITAL ROOM: A LITTLE

## 2025-04-07 ASSESSMENT — GAIT ASSESSMENTS
GAIT LEVEL OF ASSIST: SUPERVISED
DISTANCE (FEET): 150
DEVIATION: ANTALGIC
ASSISTIVE DEVICE: FRONT WHEEL WALKER

## 2025-04-07 ASSESSMENT — FIBROSIS 4 INDEX: FIB4 SCORE: 1.58

## 2025-04-07 NOTE — ANESTHESIA PROCEDURE NOTES
Peripheral Block    Date/Time: 4/7/2025 10:45 AM    Performed by: Kim Rose M.D.  Authorized by: Kim Rose M.D.    Patient Location:  Pre-op  Start Time:  4/7/2025 10:45 AM  End Time:  4/7/2025 10:50 AM  Reason for Block: at surgeon's request and post-op pain management ONLY    patient identified, IV checked, site marked, risks and benefits discussed, surgical consent, monitors and equipment checked, pre-op evaluation and timeout performed    Patient Position:  Supine  Prep: ChloraPrep    Monitoring:  Heart rate, continuous pulse ox and cardiac monitor  Block Region:  Lower Extremity  Lower Extremity - Block Type:  Selective FEMORAL nerve block at the Adductor Canal    Laterality:  Right  Procedures: ultrasound guided  Image captured, interpreted and electronically stored.  Local Infiltration:  Lidocaine  Strength:  1 %  Dose:  3 ml  Block Type:  Single-shot  Needle Length:  100mm  Needle Gauge:  21 G  Needle Localization:  Ultrasound guidance  Ultrasound picture in chart  Injection Assessment:  Negative aspiration for heme, no paresthesia on injection, incremental injection and local visualized surrounding nerve on ultrasound  Evidence of intravascular injection: No     US Guided Selective Femoral Nerve Block at Adductor Canal:   US probe placed at mid-thigh level on externally rotated leg and femur identified.  Probe directed medially until Sartorius Muscle (SM), Femoral Artery (FA) and Saphenous Nerve (SN) identified in Adductor Canal (AC).  Needle inserted anterolateral to probe in an in plane approach into a subsartorial perivascular perineural position.  After negative aspiration LA injected with ease and visualized spreading within the AC.

## 2025-04-07 NOTE — OR NURSING
1336 Received report from Krista CASTILLO.     1535 Pt transferred to stage 2, assisted into clothing.     1545 Pt working with PT. Waiting for family to arrive.     1615 Report to Jennifer CASTILLO.

## 2025-04-07 NOTE — ANESTHESIA TIME REPORT
Anesthesia Start and Stop Event Times       Date Time Event    4/7/2025 1038 Ready for Procedure     1127 Anesthesia Start     1307 Anesthesia Stop          Responsible Staff  04/07/25      Name Role Begin End    Kim Rose M.D. Anesth 1127 1307          Overtime Reason:  no overtime (within assigned shift)    Comments:

## 2025-04-07 NOTE — INTERVAL H&P NOTE
Consented Procedure: RIGHT TOTAL KNEE ARTHROPLASTY  I have examined the patient, provided the risks, benefits, and alternatives to the procedure(s) indicated on the signed consent form, and the patient wishes to proceed.    H&P reviewed. The patient was examined and there are no changes to the H&P    The risks of surgery were discussed with the patient. I did again reiterate the increased risks of foot drop/neuropraxia with correction of an acquired valgus deformity    Additional risks include pain, bleeding, infection, fractures and dislocations as well as damage to surrounding structures or neurovascular compromise.  The risks include the possibility of need for further surgery, lack of healing, lack of symptom relief.  The elevated risk of DVT following a joint replacement was discussed with the patient, and a plan was made for postoperative DVT prophylaxis.  We did discuss the possibility of leg length discrepancy both apparent and actual following joint replacement.      They do express an understanding that these are man-made parts, with limited lifespans, and that we would not be turning them into a bionic human being. We did discuss the various approaches to a joint replacement, and made no guarantees about incision size or postoperative discharge plans. Certainly there could be anesthetic complications such as heart attack, stroke, respiratory failure, or even death.      In addition to these significant complications, we have also informed the patient that there is some risk of dissatisfaction with their joint replacement.  I have told them that a joint replacement changes your life, usually for the better, but that certain patients do not like how their joint feels or moves after surgery.     All of the patient's questions were answered, they were shown models of the implants and images of their x-rays. she expressed understanding and wished to proceed.     We have told the patient a front wheeled walker  will be required in the perioperative period. We did discuss the importance of physical therapy and the risk of permanent stiffness or muscle weakness after surgery.         Kusum Johnson M.D.  04/07/25 10:13 AM

## 2025-04-07 NOTE — ANESTHESIA POSTPROCEDURE EVALUATION
Patient: Jessica Yoder [place]    Procedure Summary       Date: 04/07/25 Room / Location:  OR  / SURGERY [facility]    Anesthesia Start: 1127 Anesthesia Stop: 1307    Procedure: RIGHT TOTAL KNEE ARTHROPLASTY (Right: Knee) Diagnosis:       Arthritis of right knee      (Arthritis of right knee [M17.11])    Surgeons: Kusum Johnson M.D. Responsible Provider: Kim Rose M.D.    Anesthesia Type: general, peripheral nerve block ASA Status: 2            Final Anesthesia Type: general, peripheral nerve block  Last vitals  BP   Blood Pressure : (!) 154/75    Temp   36.4 °C (97.5 °F)    Pulse   60   Resp   16    SpO2   99 %      Anesthesia Post Evaluation    Patient location during evaluation: PACU  Patient participation: complete - patient participated  Level of consciousness: awake and alert  Pain score: 1    Airway patency: patent  Anesthetic complications: no  Cardiovascular status: hemodynamically stable  Respiratory status: acceptable and face mask  Hydration status: euvolemic    PONV: none          There were no known notable events for this encounter.     Nurse Pain Score: 1 (NPRS)

## 2025-04-07 NOTE — ANESTHESIA PREPROCEDURE EVALUATION
Case: [id number] Date/Time: 04/07/25 1045    Procedure: RIGHT TOTAL KNEE ARTHROPLASTY (Right: Knee)    Anesthesia type: General    Diagnosis: Arthritis of right knee [M17.11]    Pre-op diagnosis: Arthritis of right knee [M17.11]    Location: [place] [address] / SURGERY [facility]    Surgeons: Kusum Johnson M.D.            Relevant Problems   Other   (positive) Arthritis of right knee       Physical Exam    Airway   Mallampati: II  TM distance: >3 FB  Neck ROM: full       Cardiovascular - normal exam  Rhythm: regular  Rate: normal  (-) murmur     Dental - normal exam           Pulmonary - normal exam  Breath sounds clear to auscultation     Abdominal    Neurological - normal exam         Other findings: No loose teeth               Anesthesia Plan    ASA 2       Plan - general and peripheral nerve block     Peripheral nerve block will be post-op pain control  Airway plan will be LMA          Induction: intravenous    Postoperative Plan: Postoperative administration of opioids is intended.    Pertinent diagnostic labs and testing reviewed    Informed Consent:    Anesthetic plan and risks discussed with patient.    Use of blood products discussed with: patient whom consented to blood products.

## 2025-04-07 NOTE — OR NURSING
1316: Patient arrived from OR via gurney.  R knee dressing CDI; pedal pulse found with doppler. Cold pack to R knee .     Sedation/Resp Status: Eye opening to verbal.  Respirations spontaneous and non-labored.    HR69; VSS on 4L 02 via simple mask.    Patient resting, no complaints of pain or nausea at this time.     1349: patient states pain is 5/10 and she is nauseous, plan to medicate. See MAR.     1355: Gave report to JONATHAN Vera.     1427: Received report from JONATHAN Vera.     1500: Called , he is on his way here from [place].     1530: Gave report to JONATHAN Finley in Stage II.     1535: Patient to Stage II.

## 2025-04-07 NOTE — DISCHARGE INSTRUCTIONS
Dr. Johnson's Discharge Instructions:  The first week after your joint replacement is a time to recover from the surgery. We expect light exercise to keep you active and mobile. Dr. Johnson requires a walker or cane for most patients in the first few days following surgery to try to prevent falls or complications.     Most patients are prescribed two medications for pain control: a narcotic such as Oxycodone or Hydrocodone and a milder medication called Tramadol. These can be alternated for pain control, and the priority is to decrease use of the stronger narcotic as soon as tolerated.   Take your pain medication as appropriate to ensure that your pain is not limiting your recovery. You'll be seen in clinic in 7-10 days (this appointment has already been made, call our office if you're unsure of the time). At that time, we'll prescribe your physical therapy to help with the recovery phase.     -Keep dressing clean and dry. Leave dressing in place until follow up. If you have an incisional vac dressing, the battery may die before your first post operative appointment, but you can leave the surgical dressing in place and it will be removed at your clinic visit. The battery of the dressing may die, and the sponge will inflate because it is no longer holding suction. You can still leave the dressing in place and it will be removed at the office. Call the office if you notice drainage from the surgical dressing.    -OK to shower, keep dressing in place. Pat dressing dry, do not rub incision    -Do not soak incision in bath, hot tub or pool    -Follow up with Dr. Johnson at regularly scheduled time    -Call [facility] office at [phone] for questions    -Weight bearing status: as tolerated with walker/cane; Patients who underwent a hip replacement should observe posterior hip precautions    -Take medication as prescribed for DVT prophylaxis            ACTIVITY: Rest and take it easy for the first 24 hours.  A responsible adult  is recommended to remain with you during that time.  It is normal to feel sleepy.  We encourage you to not do anything that requires balance, judgment or coordination.    MILD FLU-LIKE SYMPTOMS ARE NORMAL. YOU MAY EXPERIENCE GENERALIZED MUSCLE ACHES, THROAT IRRITATION, HEADACHE AND/OR SOME NAUSEA.    FOR 24 HOURS DO NOT:  Drive, operate machinery or run household appliances.  Drink beer or alcoholic beverages.   Make important decisions or sign legal documents.    DIET: To avoid nausea, slowly advance diet as tolerated, avoiding spicy or greasy foods for the first day.  Add more substantial food to your diet according to your physician's instructions. INCREASE FLUIDS AND FIBER TO AVOID CONSTIPATION.    FOLLOW-UP APPOINTMENT:  A follow-up appointment should be arranged with your doctor, call to schedule.    You should CALL YOUR PHYSICIAN if you develop:  Fever greater than 101 degrees F.  Pain not relieved by medication, or persistent nausea or vomiting.  Excessive bleeding (blood soaking through dressing) or unexpected drainage from the wound.  Extreme redness or swelling around the incision site, drainage of pus or foul smelling drainage.  Inability to urinate or empty your bladder within 8 hours.  Problems with breathing or chest pain.    You should call 911 if you develop problems with breathing or chest pain.  If you are unable to contact your doctor or surgical center, you should go to the nearest emergency room or urgent care center.  Physician's telephone #: [phone]    If any questions arise, call your doctor.  If your doctor is not available, please feel free to call the Surgical Center at [phone].     A registered nurse may call you a few days after your surgery to see how you are doing after your procedure.    MEDICATIONS: Resume taking daily medication.  Take prescribed pain medication with food.  If no medication is prescribed, you may take non-aspirin pain medication if needed.  PAIN  MEDICATION CAN BE VERY CONSTIPATING.  Take a stool softener or laxative such as senokot, pericolace, or milk of magnesia if needed.    Last pain medication Oxycodone given at 1349.    If your physician has prescribed pain medication that includes Acetaminophen (Tylenol), do not take additional Acetaminophen (Tylenol) while taking the prescribed medication.          Peripheral Nerve Block Discharge Instructions from Same Day Surgery and Inpatient :    What to Expect - Lower Extremity  The block may cause you to experience numbness and weakness in your hip and thigh, thigh and knee, or calf and foot on the same side as your surgery  Numbness, tingling and / or weakness are all normal. For some people, this may be an unpleasant sensation  These issues will be resolved when the local anesthetic wears off   You may experience numbness and tingling in your thigh on the same side as your surgery if the block medicine was injected at your groin area  Numbness will make it difficult to walk  You may have problems with balance and walking so be very careful   Follow your surgeon's direction regarding weight bearing on your surgical limb  Be very careful with your numb limb  Precautions  The numbness may affect your balance  Be careful when walking or moving around  Your leg may be weak: be very careful putting weight on it  If your surgeon did not specify a time, you should not bear weight for 24 hours  Be sure to ask for help when you need it  It is better to have help than to fall and hurt yourself  Shoulder Surgery Side Effects  In addition to numbness and weakness you may experience other symptoms  Other nerves that are close to those nerves injected can also be affected by local anesthesia  You may experience a hoarseness in your voice  Your breathing may feel different  You may also notice drooping of your eyelid, pupil constriction, and decreased sweating, on the side of your surgery  All of these side effects are  "normal and will resolve when the local anesthetic wears off   Prevent Injury  Protect the limb like a baby  Beware of exposing your limb to extreme heat or cold or trauma  The limb may be injured without you noticing because it is numb  Keep the limb elevated whenever possible  Do not sleep on the limb  Change the position of the limb regularly  Avoid putting pressure on your surgical limb  Pain Control  The initial block on the day of surgery will make your extremity feel \"numb\"  Any consecutive injection including prior to discharge from the hospital will make your extremity feel \"numb\"  You may feel an aching or burning when the local anesthesia starts to wear off  Take pain pills as prescribed by your surgeon  Call your surgeon or anesthesiologist if you do not have adequate pain control    "

## 2025-04-07 NOTE — LETTER
February 3, 2025    Patient Name: Jessica Ojedafill  Surgeon Name: Kusum Johnson M.D.  Surgery Facility: [facility] ([address] [facility])  Surgery Date: 4/7/2025    The time of your surgery is not final and may change up to and until the day of your surgery. You will be contacted 24-48 hours prior to your surgery date with your check-in and surgery time.    If you will not be at one of the below numbers please call the surgery scheduler at [phone]  Preferred Phone: [phone]    BEFORE YOUR SURGERY   Pre Registration and/or Lab Work must be done within and no earlier than 28 days prior to your surgery date. Your scheduled facility will contact you regarding all required preregistration and/or lab work. If you have not been contacted within 7 days of your scheduled procedure please call [facility] at [phone] for an appointment as soon as possible.    Pre op Appointment:   Date: 4/3/2025   Time: 10:15AM   Provider: Kusum Johnson MD   Location: [address] [place] [id number]  Instructions: Bring a list of all medications you are taking including the dosing and frequency.    - Please  your non-narcotic prescriptions prior to surgery at the pharmacy you provided us. DON'T START them until after your surgery.    DAY OF YOUR SURGERY  Nothing to eat or drink after midnight     Refrain from smoking any substance after midnight prior to surgery. Smoking may interfere with the anesthetic and frequently produces nausea during the recovery period.    Continue taking all lifesaving medications. Including the morning of your surgery with small sip of water.    Please do NOT take on the day of surgery:  Diuretics: examples- furosemide (Lasix), spironolactone, hydrochlorothiazide  ACE-inhibitors: examples- lisinopril, ramipril, enalapril  “ARBs”: examples- losartan, Olmesartan, valsartan    Please arrive at the hospital/surgery center at  the check-in time provided.     An adult will need to bring you and take you home after your surgery.     AFTER YOUR SURGERY  Post op Appointment:   Date: 4/17/2025   Time: 10:15AM   With: Kusum Johnson MD   [place] [address] [place] [address]    - Therapy- Your first appointment should be 1  week(s) after your surgery. For your convenience we have 4 Physical Therapy locations: [place], [facility], [place], and [facility]. Call our office ASAP to schedule an appointment at [phone] or take the enclosed Therapy Prescription to a facility of your choice.  - No dental work for 3-6 months after your surgery.  - You must have someone provide transportation post surgery and someone to monitor you for at least 24 hours post-surgery. If you don't have either of these your appointment will be canceled.     TIME OFF WORK  FMLA or Disability forms can be faxed directly to: [phone] or you may drop them off at [facility] [place] [address]. Our office charges a $35.00 fee per form. Forms will be completed within 10 business days of drop off and payment received. For the status of your forms you may contact our disability office directly [phone].    MEDICATION INSTRUCTIONS **Please read section completely**  The following medications should be stopped a minimum of 10 days prior to surgery:  All over the counter, Supplements & Herbal medications    Anorectics: Phentermine (Adipex-P, Lomaira and Suprenza), Phentermine-topiramate (Qsymia), Bupropion-naltrexone (Contrave)    **If you are on Bupropion for anxiety/depression, please continue this medication up until the day of surgery.     Opiod Partial Agonists/Opioid Antagonists: Buprenorphine (Suboxone, Belbuca, Butrans, Probuphine Implant, Sublocade), Naltrexone (ReVia, Vivitrol), Naloxone    Amphetamines: Dextroamphetamine/Amphetamine (Adderall, Mydayis), Methylphenidate Hydrochloride (Concerta, Metadate, Methylin, Ritalin)    The following  medications should be stopped 5 days prior to surgery:  Blood Thinners: Any Aspirin, Aspirin products, anti-inflammatories such as ibuprofen and any blood thinners such as Coumadin and Plavix. Please consult your prescribing physician if you are on life saving blood thinners, in regards to when to stop medications prior to surgery.     The following medications should be stopped a minimum of 3 days prior to surgery:  PDE-5 inhibitors: Sildenafil (Viagra), Tadalafil (Cialis), Vardenafil (Levitra), Avanafil (Stendra)    MAO Inhibitors: Rasagiline (Azilect), Selegiline (Eldepryl, Emsam, Selapar), Isocarboxazid (Marplan), Phenelzine (Nardil)

## 2025-04-07 NOTE — OP REPORT
Date of Surgery:  04/07/25    Pre-operative Diagnosis:  right knee arthritis    Post-operative Diagnosis:  right knee arthritis    Procedure:  right knee replacement using robotic haptic arm assistance (Reynaldo)    Implants used:  A Agenda Triathlon total knee arthroplasty system with the following components  Femur: 5 Right PS  Tibia: 5 Universal  Polyethylene: 9 mm TS  Patella: A32 All Poly  Fixation: 2 packages Simplex HV    Surgeon:  Kusum Johnson MD    1st Assistant:   Calixto Carpenter PA-C    Anesthesiologist:   Kim Rose M.D.     EBL:  200 cc    Specimen:  None    Findings:  Tricompartmental arthritic changes; 8 degree flexion contracture; significant valgus to right tibia    Indications for procedure:  This patient is a 75 y.o. female with a long standing history of right knee arthritis. The patient had failed conservative therapy including anti-inflammatory medications, activity modifications, injections, physical therapy and assistive devices. she was indicated for a total knee replacement. The patient expressed an understanding of the risks of pain, bleeding, infection, dislocation, malalignment, need for further surgery, damage to surrounding structures, neurovascular compromise, blood clots, leg-length discrepancy both apparent and actual, anesthetic complications, and serious medical consequences including but not limited to heart attack, stroke or even death. It was explained that the implants are man-made products and thus subject to possible failure.  The patient expressed an understanding and wished to proceed. Specific risks based on the patient's medical history were addressed. A clearance was obtained by the medical physicians and the patient was taken to the operating room on the day of surgery for the above named procedure. The patient was felt to be an excellent candidate for our robotic assisted protocols, and the appropriate imaging and pre-operative plans were obtained and verified prior to  surgery    Description of procedure:  The patient was met in the pre-operative holding area. The right knee was marked as the appropriate surgical site with indelible ink. They were taken to the operating room where the anesthesia department started a adductor/general for intraoperative and post-operative anesthesia and pain control. The patient was placed on the OR table and a tourniquet was placed high on the operative thigh. All bony prominences were padded appropriately. The operative knee was prepped and draped in a standard sterile surgical fashion. A time out procedure was called to verify the side and site of surgery, the proposed surgical procedure, and the administration of pre-operative antibiotics. After successful completion of the time out procedure, our attention was turned to the operative knee.  The tourniquet was inflated after exsanguination with an Esmarch bandage. The knee was flexed and a straight incision was made just medial to the midline. The capsule of the knee was cleared and a mid-vastus arthrotomy was performed. The patella was subluxated laterally and a medial release was performed to properly visualize the posteromedial aspect of the tibial plateau. The knee was extended, the patellar tendon was protected and the infrapatellar fat pad was excised. A lateral release was performed. The patella was turned on its side and held in place with two penetrating towel clips. A free-hand patellar cut was made, the patella was sized and the appropriate lug holes were drilled. The patella was subluxed, the knee was flexed. The tourniquet was released. Hemostasis was obtained. Each hemijoint was cleared of soft tissue. The ACL was removed. At this point we placed the trackers and satellite arrays for the robotic-assisted arm. The hip center and the ankle center were verified. Multiple checkpoints from the patient's imaging were correlated intraoperatively. We did balance the ligaments in extension  and flexion. Once we had our plan, the haptic arm was brought in. The saw blade and femoral and tibial checkpoints were verified. We made our cuts taking care to protect the soft tissue structures. The bony pieces were removed.   A large Baker's cyst was evacuated, the laminar spreaders were placed and posterior osteophytes were removed. PCL was removed and the box for the post was cut.   A trial knee was constructed and with the 9 mm PS polyethylene we noted full extension without recurvatum and greater than 125 degrees of flexion with appropriate patellar tracking. Given her laxity the decision was made to place a Total Stabilized Poly, and this was opened. At this point we removed the trial components and thoroughly irrigated the wound. Osteophytes were removed. The tourniquet was reinflated.  The real components were opened in a sterile fashion on the back table and then implanted into place sequentially, first the tibia, then the femur, then the patella.  The knee was again taken through a range of motion. Again we noted full extension and greater than 125 degrees of flexion with appropriate varus/valgus stability and patellar tracking. Therefore the real polyethylene was opened and inserted into the tibial tray. An audible click was heard as it engaged the tibia. Now a dilute betadine solution  was instilled into the knee for 3 minutes before being pulse-lavaged out. The tourniquet was again released and hemostasis was obtained. The knee was flexed, the arthrotomy was closed with #1 Quill, followed by 2-0 Vicryl in the deep dermal layer in a buried interrupted fashion. The skin was closed with 3-0 monocryl in a running subcuticular fashion, and a sterile Silver Impregnated dressing was applied. The patient is currently in the operating room awaiting reversal of anesthesia. All sponge, checkpoint and instrument counts were correct at the end of the case.  A front wheel walker will be provided and will be  required in the perioperative period to assist with balance, weakness, and pain during ambulation, and to help prevent falls.

## 2025-04-07 NOTE — THERAPY
Physical Therapy   Initial Evaluation     Patient Name: Jessica Darden  Age:  75 y.o., Sex:  female  Medical Record #: [id number]  Today's Date: 4/7/2025     Precautions  Precautions: (P) Weight Bearing As Tolerated Right Lower Extremity    Assessment  Patient is 75 y.o. female who was seen s/p right TKA with WBAT orders for right lower extremity. Pt was agreeable to therapy evaluation and presented with safe upright functional mobility with AD use after therapy session. Pt was provided with education on elevation, icing, positioning, and supine/seated therapeutic exercises. Pt was able to return demo safe gait mechanics with use of AD on flat level surfaces with appropriate heel to toe gait mechanics. Pt was able to return demo safe mechanics in order to navigate stairs with use of FWW. Pt was able to demonstrate safe use of AD during transfers/transitions and general locomotion with no nico LOB. Pt was primarily limited by poor safety awareness and poor insight. Pt tends to challenge her balance at times and requires VC to avoid reaching for objects on floor and keeping hands on FWW at all times. Pt has no acute skilled PT needs at this time, anticipate pt to d/c home once medically clear with recs for FWW use and OP therapy services.     The pt was provided with the following skilled therapy txt: Pt was provided with VC, demo, and facilitation during gait training in order to return demo terminal knee extension and appropriate heel strike/flexion of knee in order to return demo appropriate heel to toe gait mechanics. Pt was provided with demo and VC to go up/down steps with safe mechanics with use of AD. Pt was provided with VC and TC for appropriate quad contraction and mechanics during supine/seated therapeutic exercises. Pt was provided with education on intensity, frequency, and duration of exercises.    Plan    Physical Therapy Initial Treatment Plan   Duration: (P) Evaluation only    DC Equipment  Recommendations: (P) Front-Wheel Walker  Discharge Recommendations: (P) Recommend outpatient physical therapy services to address higher level deficits     Objective       04/07/25 1609   Initial Contact Note    Initial Contact Note Order Received and Verified, Evaluation Only - Patient Does Not Require Further Acute Physical Therapy at this Time.  However, May Benefit from Post Acute Therapy for Higher Level Functional Deficits.   Precautions   Precautions Weight Bearing As Tolerated Right Lower Extremity   Pain 0 - 10 Group   Location Knee   Location Orientation Right   Description Sore   Therapist Pain Assessment Prior to Activity;During Activity;Post Activity;Nurse Notified;5   Prior Living Situation   Prior Services None   Housing / Facility 1 Story House   Steps Into Home 1   Steps In Home 0   Equipment Owned Front-Wheel Walker   Lives with - Patient's Self Care Capacity Spouse   Comments pt states spouse is supportive and can assist upon d/c to home   Prior Level of Functional Mobility   Bed Mobility Independent   Transfer Status Independent   Ambulation Independent   Assistive Devices Used None   Stairs Independent   Comments reports of an IPLOF   History of Falls   History of Falls No   Cognition    Cognition / Consciousness X   Safety Awareness Impaired   Comments poor insight in balance at times   Passive ROM Upper Body   Passive ROM Upper Body WDL   Active ROM Upper Body   Active ROM Upper Body  WDL   Strength Upper Body   Upper Body Strength  WDL   Sensation Upper Body   Upper Extremity Sensation  WDL   Upper Body Muscle Tone   Upper Body Muscle Tone  WDL   Passive ROM Lower Body   Passive ROM Lower Body X   Comments limited due to pain, able to demo 0-90 deg of R knee ROM   Active ROM Lower Body    Active ROM Lower Body  X   Comments same as above   Strength Lower Body   Lower Body Strength  X   Comments limited due to pain, able to demo functional strength for B LE   Sensation Lower Body   Lower  Extremity Sensation   X   Comments reports of dec sensation at heel of R foot, poor sensation with ground per patient report   Lower Body Muscle Tone   Lower Body Muscle Tone  WDL   Neurological Concerns   Neurological Concerns No   Coordination Upper Body   Coordination WDL   Coordination Lower Body    Coordination Lower Body  WDL   Balance Assessment   Sitting Balance (Static) Good   Sitting Balance (Dynamic) Fair +   Standing Balance (Static) Good   Standing Balance (Dynamic) Fair +   Weight Shift Sitting Good   Weight Shift Standing Fair   Comments w/fww use, no nico LOB noted   Bed Mobility    Comments found up in chair   Gait Analysis   Gait Level Of Assist Supervised   Assistive Device Front Wheel Walker   Distance (Feet) 150   # of Times Distance was Traveled 1   Deviation Antalgic   # of Stairs Climbed 2   Level of Assist with Stairs Supervised   Weight Bearing Status WBAT R LE   Functional Mobility   Sit to Stand Supervised   Bed, Chair, Wheelchair Transfer Standby Assist   Transfer Method Stand Step   Mobility sit<>stand, ambulation, stairs, transfer back to chair   Comments had slight LOB during transfer to chair, as pt is easily distracted and tends to  trash from floor at times   6 Clicks Assessment - How much HELP from from another person do you currently need... (If the patient hasn't done an activity recently, how much help from another person do you think he/she would need if he/she tried?)   Turning from your back to your side while in a flat bed without using bedrails? 4   Moving from lying on your back to sitting on the side of a flat bed without using bedrails? 4   Moving to and from a bed to a chair (including a wheelchair)? 4   Standing up from a chair using your arms (e.g., wheelchair, or bedside chair)? 4   Walking in hospital room? 3   Climbing 3-5 steps with a railing? 3   6 clicks Mobility Score 22   Activity Tolerance   Sitting in Chair functional   Sitting Edge of Bed NT    Standing 10 mins   Comments no adverse events noted   Edema / Skin Assessment   Edema / Skin  Not Assessed   Education Group   Education Provided Role of Physical Therapist;Exercises - Supine;Exercises - Seated;Gait Training;Stair Training;Use of Assistive Device   Role of Physical Therapist Patient Response Patient;Acceptance;Explanation;Demonstration;Verbal Demonstration;Action Demonstration   Gait Training Patient Response Patient;Acceptance;Explanation;Demonstration;Verbal Demonstration;Action Demonstration   Stair Training Patient Response Patient;Acceptance;Explanation;Demonstration;Verbal Demonstration;Action Demonstration   Use of Assistive Device Patient Response Patient;Acceptance;Explanation;Demonstration;Verbal Demonstration;Action Demonstration   Exercises - Supine Patient Response Patient;Acceptance;Explanation;Demonstration;Handout;Action Demonstration;Verbal Demonstration   Exercises - Seated Patient Response Patient;Acceptance;Explanation;Demonstration;Handout;Verbal Demonstration;Action Demonstration   Physical Therapy Initial Treatment Plan    Duration Evaluation only   Anticipated Discharge Equipment and Recommendations   DC Equipment Recommendations Front-Wheel Walker   Discharge Recommendations Recommend outpatient physical therapy services to address higher level deficits   Interdisciplinary Plan of Care Collaboration   IDT Collaboration with  Nursing   Patient Position at End of Therapy Seated;Call Light within Reach;Phone within Reach;Tray Table within Reach;Family / Friend in Room   Collaboration Comments aware of visit and recs   Session Information   Date / Session Number  4/7 eval only

## 2025-04-07 NOTE — OR NURSING
1355 Report received from Krista CASTILLO. Pt is relaxing on gurney on room air. States that pain is 5/10 but tolerable. Tolerating PO fluids.     1411  Called and updated patients .     1416 X-ray at bedside.     1427 Report given to Krista CASTILLO.

## 2025-04-07 NOTE — ANESTHESIA PROCEDURE NOTES
Airway    Date/Time: 4/7/2025 11:32 AM    Performed by: Kim Rose M.D.  Authorized by: Kim Rose M.D.    Location:  OR  Urgency:  Elective  Indications for Airway Management:  Anesthesia      Spontaneous Ventilation: absent    Sedation Level:  Deep  Preoxygenated: Yes    Mask Difficulty Assessment:  1 - vent by mask  Final Airway Type:  Supraglottic airway  Final Supraglottic Airway:  Standard LMA    SGA Size:  4  Number of Attempts at Approach:  1

## (undated) DEVICE — SUTURE GENERAL

## (undated) DEVICE — HANDPIECE 10FT INTPLS SCT PLS IRRIGATION HAND CONTROL SET (6/PK)

## (undated) DEVICE — LACTATED RINGERS INJ 1000 ML - (14EA/CA 60CA/PF)

## (undated) DEVICE — DRESSING PETROLEUM GAUZE 5 X 9" (50EA/BX 4BX/CA)"

## (undated) DEVICE — BANDAGE ELASTIC 6 IN X 5 YDS - LATEX FREE (10/BX)

## (undated) DEVICE — DRAPE IOBAN II INCISE 23X17 - (10EA/BX 4BX/CA)

## (undated) DEVICE — LENS/HOOD FOR SPACESUIT - (32/PK) PEEL AWAY FACE

## (undated) DEVICE — DRESSING AQACEL ADVANTAGE ANTIMICROBIAL WITH HYDROFIBER .075IN X 18IN (5EA/BX)

## (undated) DEVICE — GOWN WARMING STANDARD FLEX - (30/CA)

## (undated) DEVICE — SODIUM CHL IRRIGATION 0.9% 1000ML (12EA/CA)

## (undated) DEVICE — TOWELS CLOTH SURGICAL - (4/PK 20PK/CA)

## (undated) DEVICE — CANISTER SUCTION RIGID RED 1500CC (40EA/CA)

## (undated) DEVICE — PACK TOTAL KNEE (1/CA)

## (undated) DEVICE — CONTAINER SPECIMEN BAG OR - STERILE 4 OZ W/LID (100EA/CA)

## (undated) DEVICE — GLOVE SURGICAL PROTEXIS 8 1/2 - (50PR/BX)

## (undated) DEVICE — NEEDLE SPINAL NON-SAFETY 18 GA X 3 IN (25EA/BX)

## (undated) DEVICE — DRESSING TEGADERM 8 X 12 - (10/BX 8BX/CA)

## (undated) DEVICE — DRESSING INTEGUSEAL MICROBIAL SEALANT IS100 (20EA/CA)

## (undated) DEVICE — WATER IRRIGATION STERILE 1000ML (12EA/CA)

## (undated) DEVICE — TOWEL STOP TIMEOUT SAFETY FLAG (40EA/CA)

## (undated) DEVICE — TIP INTPLS HFLO ML ORFC BTRY - (12/CS) FOR SURGILAV

## (undated) DEVICE — GOWN SURGICAL X-LARGE ULTRA - FILM-REINFORCED (20/CA)

## (undated) DEVICE — SOLUTION PREP PVP IODINE 3/4 OZ POUCH PACKET CONTAINER STERILE LATEX FREE

## (undated) DEVICE — SUTURE 2-0 VICRYL PLUS CT-1 - 8 X 18 INCH(12/BX)

## (undated) DEVICE — SUCTION INSTRUMENT YANKAUER BULBOUS TIP W/O VENT (50EA/CA)

## (undated) DEVICE — SODIUM CHL. IRRIGATION 0.9% 3000ML (4EA/CA 65CA/PF)

## (undated) DEVICE — SUTURE 3-0 MONOCRYL PLUS PS-1 - 27 INCH (36/BX)

## (undated) DEVICE — SUTURE 1 PDS-2 PLUS CTX - (24/BX)

## (undated) DEVICE — SENSOR OXIMETER ADULT SPO2 RD SET (20EA/BX)

## (undated) DEVICE — DRAPE LARGE 3 QUARTER - (20/CA)

## (undated) DEVICE — COVER LIGHT HANDLE FLEXIBLE - SOFT (2EA/PK 80PK/CA)

## (undated) DEVICE — GLOVE BIOGEL SZ 7.5 SURGICAL PF LTX - (50PR/BX 4BX/CA)

## (undated) DEVICE — BAG SPONGE COUNT 10.25 X 32 - BLUE (250/CA)

## (undated) DEVICE — Device

## (undated) DEVICE — IMPLANT FLEXIBLE DRILL 35MM (1EA)

## (undated) DEVICE — GLOVE BIOGEL SZ 8 SURGICAL PF LTX - (50PR/BX 4BX/CA)

## (undated) DEVICE — CHLORAPREP 26 ML APPLICATOR - ORANGE TINT(25/CA)

## (undated) DEVICE — PAD PREP 24 X 48 CUFFED - (100/CA)

## (undated) DEVICE — SUTURE 0 MONOCRYL VIOLET CTX 1 X 36 (36EA/BX)"

## (undated) DEVICE — BANDAGE ELASTIC STERILE VELCRO 6 X 5 YDS (25EA/CA)

## (undated) DEVICE — DRAPE IOBAN II 23 IN X 33 IN - (10/BX)

## (undated) DEVICE — BLADE SAGITTAL 34MM

## (undated) DEVICE — STAPLER SKIN DISP - (6/BX 10BX/CA) VISISTAT

## (undated) DEVICE — HUMID-VENT HEAT AND MOISTURE EXCHANGE- (50/BX)

## (undated) DEVICE — TIP INTPLS HFLO ML ORFC BTRY - (12/CS)  FOR SURGILAV

## (undated) DEVICE — SUTURE 2 TICRON BLUE GS-21 (36EA/CA)

## (undated) DEVICE — BLADE SAGITTAL SAW DUAL CUT 75.0 X 25.0MM (1/EA)

## (undated) DEVICE — GLOVE BIOGEL PI INDICATOR SZ 7.5 SURGICAL PF LF -(50/BX 4BX/CA)

## (undated) DEVICE — DRAPE U SPLIT IMP 54 X 76 - (24/CA)

## (undated) DEVICE — ELECTRODE DUAL RETURN W/ CORD - (50/PK)

## (undated) DEVICE — SYRINGE DISP. 60 CC LL - (30/BX, 12BX/CA)**WHEN THESE ARE GONE ORDER #500206**

## (undated) DEVICE — GLOVE BIOGEL PI ORTHO SZ 9 PF LF

## (undated) DEVICE — DISPOSABLE WOUND VAC PICO 10 X 30 CM - WOUND CARE (3/CA)

## (undated) DEVICE — PEN SKIN MARKER W/RULER - (50EA/BX)

## (undated) DEVICE — PACK TOTAL HIP - (1/CA)

## (undated) DEVICE — DRESSING AQUACEL AG ADVANTAGE 3.5 X 10" (10EA/BX)"

## (undated) DEVICE — DRESSING ABDOMINAL PAD STERILE 8 X 10" (360EA/CA)"

## (undated) DEVICE — BLADE 90X18X1.27MM SAW SAGITTAL

## (undated) DEVICE — SUTURE 2-0 MONOCRYL PLUS UNDYED CT-1 1 X 36 (36EA/BX)"

## (undated) DEVICE — SPONGE GAUZE NON-STERILE 4X4 - (2000/CA 10PK/CA)

## (undated) DEVICE — SET EXTENSION WITH 2 PORTS (48EA/CA) ***PART #2C8610 IS A SUBSTITUTE*****

## (undated) DEVICE — TUBING CLEARLINK DUO-VENT - C-FLO (48EA/CA)

## (undated) DEVICE — SUTURE 0 PDS-2 CTX 36 INCH - (24/BX)

## (undated) DEVICE — FIBERWIRE 2.0 (12EA/BX)

## (undated) DEVICE — TUBE E-T HI-LO CUFF 7.0MM (10EA/PK)

## (undated) DEVICE — TUBE CONNECTING SUCTION - CLEAR PLASTIC STERILE 72 IN (50EA/CA)